# Patient Record
Sex: FEMALE | Race: BLACK OR AFRICAN AMERICAN | Employment: OTHER
[De-identification: names, ages, dates, MRNs, and addresses within clinical notes are randomized per-mention and may not be internally consistent; named-entity substitution may affect disease eponyms.]

---

## 2023-02-19 ENCOUNTER — APPOINTMENT (OUTPATIENT)
Facility: HOSPITAL | Age: 73
DRG: 287 | End: 2023-02-19
Payer: MEDICARE

## 2023-02-19 ENCOUNTER — HOSPITAL ENCOUNTER (INPATIENT)
Facility: HOSPITAL | Age: 73
LOS: 3 days | Discharge: HOME | DRG: 287 | End: 2023-02-22
Attending: EMERGENCY MEDICINE | Admitting: HOSPITALIST
Payer: MEDICARE

## 2023-02-19 DIAGNOSIS — I77.819 AORTIC ECTASIA (HCC): ICD-10-CM

## 2023-02-19 DIAGNOSIS — I25.119 ATHEROSCLEROSIS OF NATIVE CORONARY ARTERY OF NATIVE HEART WITH ANGINA PECTORIS (HCC): ICD-10-CM

## 2023-02-19 DIAGNOSIS — R94.39 ABNORMAL NUCLEAR STRESS TEST: ICD-10-CM

## 2023-02-19 DIAGNOSIS — I20.0 ACCELERATING ANGINA (HCC): ICD-10-CM

## 2023-02-19 DIAGNOSIS — R07.9 ACUTE CHEST PAIN: ICD-10-CM

## 2023-02-19 DIAGNOSIS — I25.9 CHEST PAIN DUE TO MYOCARDIAL ISCHEMIA, UNSPECIFIED ISCHEMIC CHEST PAIN TYPE: ICD-10-CM

## 2023-02-19 DIAGNOSIS — R07.89 OTHER CHEST PAIN: Primary | ICD-10-CM

## 2023-02-19 PROBLEM — E11.9 TYPE 2 DIABETES MELLITUS (HCC): Status: ACTIVE | Noted: 2023-02-19

## 2023-02-19 PROBLEM — F32.A DEPRESSION: Status: ACTIVE | Noted: 2023-02-19

## 2023-02-19 PROBLEM — G89.29 CHRONIC PAIN: Status: ACTIVE | Noted: 2023-02-19

## 2023-02-19 PROBLEM — E78.5 HYPERLIPIDEMIA: Status: ACTIVE | Noted: 2023-02-19

## 2023-02-19 LAB
ALBUMIN SERPL-MCNC: 3.8 G/DL (ref 3.4–5)
ALBUMIN/GLOB SERPL: 0.9 (ref 0.8–1.7)
ALP SERPL-CCNC: 124 U/L (ref 45–117)
ALT SERPL-CCNC: 15 U/L (ref 13–56)
ANION GAP SERPL CALC-SCNC: 6 MMOL/L (ref 3–18)
AST SERPL-CCNC: 6 U/L (ref 10–38)
BASOPHILS # BLD: 0.1 K/UL (ref 0–0.1)
BASOPHILS NFR BLD: 1 % (ref 0–2)
BILIRUB SERPL-MCNC: 0.6 MG/DL (ref 0.2–1)
BUN SERPL-MCNC: 12 MG/DL (ref 7–18)
BUN/CREAT SERPL: 11 (ref 12–20)
CALCIUM SERPL-MCNC: 9.6 MG/DL (ref 8.5–10.1)
CHLORIDE SERPL-SCNC: 102 MMOL/L (ref 100–111)
CO2 SERPL-SCNC: 29 MMOL/L (ref 21–32)
CREAT SERPL-MCNC: 1.12 MG/DL (ref 0.6–1.3)
D DIMER PPP FEU-MCNC: 1.12 UG/ML(FEU)
DIFFERENTIAL METHOD BLD: ABNORMAL
EOSINOPHIL # BLD: 0.1 K/UL (ref 0–0.4)
EOSINOPHIL NFR BLD: 1 % (ref 0–5)
ERYTHROCYTE [DISTWIDTH] IN BLOOD BY AUTOMATED COUNT: 13.5 % (ref 11.6–14.5)
GLOBULIN SER CALC-MCNC: 4.3 G/DL (ref 2–4)
GLUCOSE BLD STRIP.AUTO-MCNC: 172 MG/DL (ref 70–110)
GLUCOSE BLD STRIP.AUTO-MCNC: 206 MG/DL (ref 70–110)
GLUCOSE SERPL-MCNC: 250 MG/DL (ref 74–99)
HCT VFR BLD AUTO: 41 % (ref 35–45)
HGB BLD-MCNC: 13.5 G/DL (ref 12–16)
IMM GRANULOCYTES # BLD AUTO: 0 K/UL (ref 0–0.04)
IMM GRANULOCYTES NFR BLD AUTO: 0 % (ref 0–0.5)
LIPASE SERPL-CCNC: 135 U/L (ref 73–393)
LYMPHOCYTES # BLD: 2.2 K/UL (ref 0.9–3.6)
LYMPHOCYTES NFR BLD: 19 % (ref 21–52)
MCH RBC QN AUTO: 27.4 PG (ref 24–34)
MCHC RBC AUTO-ENTMCNC: 32.9 G/DL (ref 31–37)
MCV RBC AUTO: 83.3 FL (ref 78–100)
MONOCYTES # BLD: 0.6 K/UL (ref 0.05–1.2)
MONOCYTES NFR BLD: 5 % (ref 3–10)
NEUTS SEG # BLD: 8.8 K/UL (ref 1.8–8)
NEUTS SEG NFR BLD: 75 % (ref 40–73)
NRBC # BLD: 0 K/UL (ref 0–0.01)
NRBC BLD-RTO: 0 PER 100 WBC
PLATELET # BLD AUTO: 418 K/UL (ref 135–420)
PMV BLD AUTO: 9.3 FL (ref 9.2–11.8)
POTASSIUM SERPL-SCNC: 3.5 MMOL/L (ref 3.5–5.5)
PROT SERPL-MCNC: 8.1 G/DL (ref 6.4–8.2)
RBC # BLD AUTO: 4.92 M/UL (ref 4.2–5.3)
SODIUM SERPL-SCNC: 137 MMOL/L (ref 136–145)
TROPONIN I SERPL HS-MCNC: 12 NG/L (ref 0–54)
TROPONIN I SERPL HS-MCNC: 12 NG/L (ref 0–54)
TROPONIN I SERPL HS-MCNC: 14 NG/L (ref 0–54)
WBC # BLD AUTO: 11.9 K/UL (ref 4.6–13.2)

## 2023-02-19 PROCEDURE — 82962 GLUCOSE BLOOD TEST: CPT

## 2023-02-19 PROCEDURE — 84443 ASSAY THYROID STIM HORMONE: CPT

## 2023-02-19 PROCEDURE — 2580000003 HC RX 258: Performed by: HOSPITALIST

## 2023-02-19 PROCEDURE — 71045 X-RAY EXAM CHEST 1 VIEW: CPT

## 2023-02-19 PROCEDURE — 36415 COLL VENOUS BLD VENIPUNCTURE: CPT

## 2023-02-19 PROCEDURE — 80053 COMPREHEN METABOLIC PANEL: CPT

## 2023-02-19 PROCEDURE — 85025 COMPLETE CBC W/AUTO DIFF WBC: CPT

## 2023-02-19 PROCEDURE — 1100000003 HC PRIVATE W/ TELEMETRY

## 2023-02-19 PROCEDURE — 83690 ASSAY OF LIPASE: CPT

## 2023-02-19 PROCEDURE — 84484 ASSAY OF TROPONIN QUANT: CPT

## 2023-02-19 PROCEDURE — 99285 EMERGENCY DEPT VISIT HI MDM: CPT

## 2023-02-19 PROCEDURE — 83036 HEMOGLOBIN GLYCOSYLATED A1C: CPT

## 2023-02-19 PROCEDURE — 6370000000 HC RX 637 (ALT 250 FOR IP): Performed by: HOSPITALIST

## 2023-02-19 PROCEDURE — 93005 ELECTROCARDIOGRAM TRACING: CPT | Performed by: EMERGENCY MEDICINE

## 2023-02-19 PROCEDURE — 6360000002 HC RX W HCPCS: Performed by: HOSPITALIST

## 2023-02-19 PROCEDURE — 6360000004 HC RX CONTRAST MEDICATION: Performed by: EMERGENCY MEDICINE

## 2023-02-19 PROCEDURE — 71275 CT ANGIOGRAPHY CHEST: CPT

## 2023-02-19 PROCEDURE — 85379 FIBRIN DEGRADATION QUANT: CPT

## 2023-02-19 PROCEDURE — 80061 LIPID PANEL: CPT

## 2023-02-19 RX ORDER — ASPIRIN 81 MG/1
81 TABLET, CHEWABLE ORAL DAILY
COMMUNITY

## 2023-02-19 RX ORDER — SODIUM CHLORIDE 9 MG/ML
INJECTION, SOLUTION INTRAVENOUS PRN
Status: DISCONTINUED | OUTPATIENT
Start: 2023-02-19 | End: 2023-02-22 | Stop reason: HOSPADM

## 2023-02-19 RX ORDER — VENLAFAXINE HYDROCHLORIDE 75 MG/1
CAPSULE, EXTENDED RELEASE ORAL
COMMUNITY
Start: 2023-01-25

## 2023-02-19 RX ORDER — SODIUM CHLORIDE 9 MG/ML
INJECTION, SOLUTION INTRAVENOUS CONTINUOUS
Status: DISPENSED | OUTPATIENT
Start: 2023-02-19 | End: 2023-02-20

## 2023-02-19 RX ORDER — ONDANSETRON 2 MG/ML
4 INJECTION INTRAMUSCULAR; INTRAVENOUS EVERY 6 HOURS PRN
Status: DISCONTINUED | OUTPATIENT
Start: 2023-02-19 | End: 2023-02-22 | Stop reason: HOSPADM

## 2023-02-19 RX ORDER — ACETAMINOPHEN 325 MG/1
650 TABLET ORAL EVERY 6 HOURS PRN
Status: DISCONTINUED | OUTPATIENT
Start: 2023-02-19 | End: 2023-02-22 | Stop reason: HOSPADM

## 2023-02-19 RX ORDER — ALPRAZOLAM 0.5 MG/1
0.5 TABLET ORAL DAILY
COMMUNITY
Start: 2023-02-15

## 2023-02-19 RX ORDER — ZOLPIDEM TARTRATE 5 MG/1
5 TABLET ORAL NIGHTLY PRN
Status: DISCONTINUED | OUTPATIENT
Start: 2023-02-19 | End: 2023-02-22 | Stop reason: HOSPADM

## 2023-02-19 RX ORDER — VENLAFAXINE HYDROCHLORIDE 150 MG/1
150 CAPSULE, EXTENDED RELEASE ORAL
Status: DISCONTINUED | OUTPATIENT
Start: 2023-02-20 | End: 2023-02-22 | Stop reason: HOSPADM

## 2023-02-19 RX ORDER — NALOXONE HYDROCHLORIDE 4 MG/.1ML
4 SPRAY NASAL PRN
COMMUNITY
Start: 2022-10-01

## 2023-02-19 RX ORDER — ALPRAZOLAM 0.5 MG/1
0.5 TABLET ORAL EVERY 8 HOURS PRN
Status: DISCONTINUED | OUTPATIENT
Start: 2023-02-19 | End: 2023-02-22 | Stop reason: HOSPADM

## 2023-02-19 RX ORDER — POTASSIUM CHLORIDE 20 MEQ/1
20 TABLET, EXTENDED RELEASE ORAL
Status: DISCONTINUED | OUTPATIENT
Start: 2023-02-20 | End: 2023-02-22 | Stop reason: HOSPADM

## 2023-02-19 RX ORDER — DEXTROSE MONOHYDRATE 100 MG/ML
INJECTION, SOLUTION INTRAVENOUS CONTINUOUS PRN
Status: DISCONTINUED | OUTPATIENT
Start: 2023-02-19 | End: 2023-02-22 | Stop reason: HOSPADM

## 2023-02-19 RX ORDER — INSULIN LISPRO 100 [IU]/ML
0-16 INJECTION, SOLUTION INTRAVENOUS; SUBCUTANEOUS
Status: DISCONTINUED | OUTPATIENT
Start: 2023-02-19 | End: 2023-02-22 | Stop reason: HOSPADM

## 2023-02-19 RX ORDER — VENLAFAXINE HYDROCHLORIDE 150 MG/1
150 CAPSULE, EXTENDED RELEASE ORAL DAILY
COMMUNITY
Start: 2022-11-22

## 2023-02-19 RX ORDER — MORPHINE SULFATE 2 MG/ML
1 INJECTION, SOLUTION INTRAMUSCULAR; INTRAVENOUS EVERY 4 HOURS PRN
Status: DISCONTINUED | OUTPATIENT
Start: 2023-02-19 | End: 2023-02-22 | Stop reason: HOSPADM

## 2023-02-19 RX ORDER — HYDROCHLOROTHIAZIDE 25 MG/1
25 TABLET ORAL DAILY
COMMUNITY
Start: 2022-11-22

## 2023-02-19 RX ORDER — HYDROCODONE BITARTRATE AND ACETAMINOPHEN 10; 325 MG/1; MG/1
1 TABLET ORAL EVERY 6 HOURS PRN
Status: DISCONTINUED | OUTPATIENT
Start: 2023-02-19 | End: 2023-02-22 | Stop reason: HOSPADM

## 2023-02-19 RX ORDER — HYDROCHLOROTHIAZIDE 25 MG/1
25 TABLET ORAL DAILY
Status: DISCONTINUED | OUTPATIENT
Start: 2023-02-19 | End: 2023-02-22 | Stop reason: HOSPADM

## 2023-02-19 RX ORDER — NALOXONE HYDROCHLORIDE 0.4 MG/ML
0.4 INJECTION, SOLUTION INTRAMUSCULAR; INTRAVENOUS; SUBCUTANEOUS PRN
Status: DISCONTINUED | OUTPATIENT
Start: 2023-02-19 | End: 2023-02-22 | Stop reason: HOSPADM

## 2023-02-19 RX ORDER — LISINOPRIL 20 MG/1
20 TABLET ORAL DAILY
Status: DISCONTINUED | OUTPATIENT
Start: 2023-02-19 | End: 2023-02-22 | Stop reason: HOSPADM

## 2023-02-19 RX ORDER — ONDANSETRON 4 MG/1
4 TABLET, ORALLY DISINTEGRATING ORAL EVERY 8 HOURS PRN
Status: DISCONTINUED | OUTPATIENT
Start: 2023-02-19 | End: 2023-02-22 | Stop reason: HOSPADM

## 2023-02-19 RX ORDER — POLYETHYLENE GLYCOL 3350 17 G/17G
17 POWDER, FOR SOLUTION ORAL DAILY PRN
Status: DISCONTINUED | OUTPATIENT
Start: 2023-02-19 | End: 2023-02-22 | Stop reason: HOSPADM

## 2023-02-19 RX ORDER — INSULIN LISPRO 100 [IU]/ML
0-4 INJECTION, SOLUTION INTRAVENOUS; SUBCUTANEOUS NIGHTLY
Status: DISCONTINUED | OUTPATIENT
Start: 2023-02-19 | End: 2023-02-22 | Stop reason: HOSPADM

## 2023-02-19 RX ORDER — AMLODIPINE BESYLATE AND BENAZEPRIL HYDROCHLORIDE 5; 20 MG/1; MG/1
1 CAPSULE ORAL DAILY
COMMUNITY
Start: 2022-11-22

## 2023-02-19 RX ORDER — HYDROCODONE BITARTRATE AND ACETAMINOPHEN 10; 325 MG/1; MG/1
1 TABLET ORAL EVERY 6 HOURS PRN
COMMUNITY
Start: 2023-02-08

## 2023-02-19 RX ORDER — SODIUM CHLORIDE 0.9 % (FLUSH) 0.9 %
5-40 SYRINGE (ML) INJECTION PRN
Status: DISCONTINUED | OUTPATIENT
Start: 2023-02-19 | End: 2023-02-22 | Stop reason: HOSPADM

## 2023-02-19 RX ORDER — ASPIRIN 81 MG/1
81 TABLET ORAL DAILY
Status: DISCONTINUED | OUTPATIENT
Start: 2023-02-19 | End: 2023-02-22 | Stop reason: HOSPADM

## 2023-02-19 RX ORDER — SODIUM CHLORIDE 0.9 % (FLUSH) 0.9 %
5-40 SYRINGE (ML) INJECTION EVERY 12 HOURS SCHEDULED
Status: DISCONTINUED | OUTPATIENT
Start: 2023-02-19 | End: 2023-02-22 | Stop reason: HOSPADM

## 2023-02-19 RX ORDER — ENOXAPARIN SODIUM 100 MG/ML
40 INJECTION SUBCUTANEOUS DAILY
Status: DISCONTINUED | OUTPATIENT
Start: 2023-02-19 | End: 2023-02-22 | Stop reason: HOSPADM

## 2023-02-19 RX ORDER — ZOLPIDEM TARTRATE 10 MG/1
10 TABLET ORAL DAILY PRN
COMMUNITY
Start: 2022-10-12

## 2023-02-19 RX ORDER — PANTOPRAZOLE SODIUM 40 MG/1
40 TABLET, DELAYED RELEASE ORAL
Status: DISCONTINUED | OUTPATIENT
Start: 2023-02-20 | End: 2023-02-22 | Stop reason: HOSPADM

## 2023-02-19 RX ORDER — POTASSIUM CHLORIDE 20 MEQ/1
20 TABLET, EXTENDED RELEASE ORAL DAILY
COMMUNITY
Start: 2023-02-06

## 2023-02-19 RX ORDER — VENLAFAXINE HYDROCHLORIDE 75 MG/1
75 CAPSULE, EXTENDED RELEASE ORAL
Status: DISCONTINUED | OUTPATIENT
Start: 2023-02-20 | End: 2023-02-22 | Stop reason: HOSPADM

## 2023-02-19 RX ORDER — NITROGLYCERIN 0.4 MG/1
0.4 TABLET SUBLINGUAL EVERY 5 MIN PRN
Status: DISCONTINUED | OUTPATIENT
Start: 2023-02-19 | End: 2023-02-22 | Stop reason: HOSPADM

## 2023-02-19 RX ORDER — AMLODIPINE BESYLATE 5 MG/1
5 TABLET ORAL DAILY
Status: DISCONTINUED | OUTPATIENT
Start: 2023-02-19 | End: 2023-02-22 | Stop reason: HOSPADM

## 2023-02-19 RX ORDER — ATORVASTATIN CALCIUM 20 MG/1
20 TABLET, FILM COATED ORAL NIGHTLY
Status: DISCONTINUED | OUTPATIENT
Start: 2023-02-19 | End: 2023-02-22 | Stop reason: HOSPADM

## 2023-02-19 RX ORDER — SEMAGLUTIDE 1.34 MG/ML
0.25 INJECTION, SOLUTION SUBCUTANEOUS
COMMUNITY
Start: 2023-02-06

## 2023-02-19 RX ORDER — ACETAMINOPHEN 650 MG/1
650 SUPPOSITORY RECTAL EVERY 6 HOURS PRN
Status: DISCONTINUED | OUTPATIENT
Start: 2023-02-19 | End: 2023-02-22 | Stop reason: HOSPADM

## 2023-02-19 RX ORDER — IBUPROFEN 800 MG/1
800 TABLET ORAL EVERY 6 HOURS PRN
COMMUNITY
Start: 2023-01-21

## 2023-02-19 RX ADMIN — ALPRAZOLAM 0.5 MG: 0.5 TABLET ORAL at 22:18

## 2023-02-19 RX ADMIN — ATORVASTATIN CALCIUM 20 MG: 20 TABLET, FILM COATED ORAL at 20:31

## 2023-02-19 RX ADMIN — IOPAMIDOL 100 ML: 755 INJECTION, SOLUTION INTRAVENOUS at 14:31

## 2023-02-19 RX ADMIN — SODIUM CHLORIDE, PRESERVATIVE FREE 10 ML: 5 INJECTION INTRAVENOUS at 22:08

## 2023-02-19 RX ADMIN — LISINOPRIL 20 MG: 20 TABLET ORAL at 20:31

## 2023-02-19 RX ADMIN — ENOXAPARIN SODIUM 40 MG: 100 INJECTION SUBCUTANEOUS at 18:08

## 2023-02-19 RX ADMIN — HYDROCHLOROTHIAZIDE 25 MG: 25 TABLET ORAL at 19:02

## 2023-02-19 RX ADMIN — AMLODIPINE BESYLATE 5 MG: 5 TABLET ORAL at 19:02

## 2023-02-19 RX ADMIN — SODIUM CHLORIDE: 9 INJECTION, SOLUTION INTRAVENOUS at 18:50

## 2023-02-19 RX ADMIN — ZOLPIDEM TARTRATE 5 MG: 5 TABLET ORAL at 23:54

## 2023-02-19 ASSESSMENT — ENCOUNTER SYMPTOMS
COUGH: 0
ABDOMINAL PAIN: 0
SHORTNESS OF BREATH: 1
VOMITING: 1
NAUSEA: 1
DIARRHEA: 0
CHEST TIGHTNESS: 1

## 2023-02-19 ASSESSMENT — LIFESTYLE VARIABLES
HOW OFTEN DO YOU HAVE A DRINK CONTAINING ALCOHOL: NEVER
HOW MANY STANDARD DRINKS CONTAINING ALCOHOL DO YOU HAVE ON A TYPICAL DAY: PATIENT DOES NOT DRINK

## 2023-02-19 ASSESSMENT — HEART SCORE: ECG: 1

## 2023-02-19 NOTE — Clinical Note
TRANSFER - IN REPORT:     Verbal report received from: [address] Report consisted of patient's Situation, Background, Assessment and   Recommendations(SBAR). Opportunity for questions and clarification was provided. Assessment completed upon patient's arrival to unit and care assumed. Patient transported with a Registered Nurse.

## 2023-02-19 NOTE — ED NOTES
Pt taken upstairs by RN on cardiac monitoring. Pt ambulatory to inpt bed with steady gait, VSS, NAD.   Call bell and phone within reach  [facility], [address] [place] [address] aware of patients arrival      [address]  02/19/23 1837

## 2023-02-19 NOTE — H&P
History & Physical    Patient: Walker Madrid MRN: [id number]  CSN: [id number]    YOB: 1950  Age: 74 y.o. Sex: female      DOA: 2/19/2023  Primary Care Provider:  None Provider      Assessment/Plan     Active Hospital Problems    Diagnosis Date Noted    Chest pain [R07.9] 02/19/2023     Priority: Medium    Type 2 diabetes mellitus ([address] [E11.9] 02/19/2023     Priority: Medium    Hyperlipidemia [E78.5] 02/19/2023     Priority: Medium    Acute chest pain [R07.9] 02/19/2023     Priority: Medium    Aortic ectasia ([address] [I77.819] 02/19/2023     Priority: Medium    Depression [F32. A] 02/19/2023     Priority: Medium    Chronic pain [G89.29] 02/19/2023     Priority: Medium     Admit to tele     Chest pain  Cardiac monitor, ce trend need to r/o acs   Ekg: no st change at this point,  morphine /nitro prn for chest pain  Cardiology consult   Echo and stress test  tomorrow       DM type II , used to take insulin , now on semaglutide (OZEMPIC   ssi, diabetic diet , hypoglycemia protocol       HTN, accelerated  Continue home medication. Depression and anxiety   On effexor highest dose at home   Xanax at home as needed   Also on ambien at night     Hld check lipid profile continue lipitor     Aortic ectasia  Need to follow-up with cardiologist    Chronic pain continue home pain medication  Narcan as needed    We will give low rate hydration overnight. Full code   Please note that this dictation was completed with [employer], the [employer] voice recognition software. Quite often unanticipated grammatical, syntax, homophones, and other interpretive errors are inadvertently transcribed by the computer software. Please disregard these errors.   Please excuse any errors that have escaped final proofreading    Estimate  length of stay : 1-2 days     DVT :lovenox and ppi   CC: chest tightness       HPI:     Walker Madrid is a 74 y.o. female with history of diabetes, hypertension, depression, hyperlipidemia, insomnia presented to ER due to chest tightness last night. Associated with nausea and diaphoresis. The tightness is in the middle of the chest.  Not associate with shortness of breath no palpitation. She was giving aspirin and a nitro per EMS, chest tightness resolved. In ER, EKG no ST segment changes and  first troponin was negative. She has elevated D-dimer,  CTA chest no PE. Cardiology has been consulted and recommend to admit to  hospital for further work-up. Patient refused to stay, after discussed with pt and family, she agrees to stay in the hospital  for ischemic cardiac work-up. Granddaughter was at the bedside. Hoe medication reviewed   BP (!) 151/77   Pulse 63   Temp 97.8 °F (36.6 °C) (Oral)   Resp 14   SpO2 99%         Surgical History    Surgical History  Surgery Date Site/Laterality Comments   COLONOSCOPY 2014 N/A/N/A Procedure: COLONOSCOPY; Surgeon: Zehra Deng MD; Location: [facility] ENDO OR LOC; Service: Endoscopy GI; Laterality: N/A; 25471     COLONOSCOPY 2014 Anus/N/A Procedure: COLONOSCOPY; Surgeon: Zehra Deng MD; Location: [facility] ENDO OR LOC; Service: Endoscopy GI; Laterality: N/A; 24928     IN ENDO COLONOSCOPY           SECTION          CHOLECYSTECTOMY        Family History    Family History  Medical History Relation Name Comments   Other Family History Father        Dementia Mother          Family History  Relation Name Status Comments   Father         Mother             Past medical history   Chronic pain   Htn   Hld     Social History     Socioeconomic History    Marital status:        Prior to Admission medications    Not on File       No Known Allergies    Review of Systems  Gen: No fever, chills, malaise, weight loss/gain. +Diaphoresis  Heent: No headache, rhinorrhea, epistaxis, ear pain, hearing loss, sinus pain, neck pain/stiffness, sore throat. Heart: + Chest tightness, no palpitations, CARSON, pnd, or orthopnea.    Resp: No cough, hemoptysis, wheezing and shortness of breath. GI: +nausea, no vomiting, diarrhea, constipation, melena or hematochezia. : No urinary obstruction, dysuria or hematuria. Derm: No rash, new skin lesion or pruritis. Musc/skeletal: no bone or joint complains. Vasc: No edema, cyanosis or claudication. Endo: No heat/cold intolerance, no polyuria,polydipsia or polyphagia. Neuro: No unilateral weakness, numbness, tingling. No seizures. Heme: No easy bruising or bleeding. Physical Exam:     Physical Exam:  BP (!) 151/77   Pulse 63   Temp 97.8 °F (36.6 °C) (Oral)   Resp 14   SpO2 99%         Temp (24hrs), Av.8 °F (36.6 °C), Min:97.8 °F (36.6 °C), Max:97.8 °F (36.6 °C)    No intake/output data recorded. No intake/output data recorded. General:  Awake, cooperative, no distress. Head:  Normocephalic, without obvious abnormality, atraumatic. Eyes:  Conjunctivae/corneas clear, sclera anicteric, PERRL, EOMs intact. Nose: Nares normal. No drainage or sinus tenderness. Throat: Lips, mucosa, and tongue normal. .   Neck: Supple, symmetrical, trachea midline, no adenopathy. Lungs:   Clear to auscultation bilaterally. Heart:  Regular rate and rhythm, S1, S2 normal, no murmur, click, rub or gallop. Abdomen: Soft, non-tender. Bowel sounds normal. No masses,  No organomegaly. Extremities: Extremities normal, atraumatic, no cyanosis or edema. Pulses: 2+ and symmetric all extremities. Skin: Skin color-pink, texture, turgor normal. No rashes or lesions. Capillary refill normal    Neurologic: CNII-XII intact. No focal motor or sensory deficit.        Labs Reviewed:       BMP:   Lab Results   Component Value Date/Time     2023 10:53 AM    K 3.5 2023 10:53 AM     2023 10:53 AM    CO2 29 2023 10:53 AM    BUN 12 2023 10:53 AM    CREATININE 1.12 2023 10:53 AM    GLUCOSE 250 2023 10:53 AM    CALCIUM 9.6 2023 10:53 AM Last 3 BMP:   Recent Labs     02/19/23  1053      K 3.5      CO2 29   BUN 12   CREATININE 1.12   GLUCOSE 250*   CALCIUM 9.6    CMP:   Lab Results   Component Value Date/Time     02/19/2023 10:53 AM    K 3.5 02/19/2023 10:53 AM     02/19/2023 10:53 AM    CO2 29 02/19/2023 10:53 AM    BUN 12 02/19/2023 10:53 AM    CREATININE 1.12 02/19/2023 10:53 AM    GLUCOSE 250 02/19/2023 10:53 AM    CALCIUM 9.6 02/19/2023 10:53 AM    PROT 8.1 02/19/2023 10:53 AM    LABALBU 3.8 02/19/2023 10:53 AM    BILITOT 0.6 02/19/2023 10:53 AM    AST 6 02/19/2023 10:53 AM    ALT 15 02/19/2023 10:53 AM      Last 3 CMP:   Recent Labs     02/19/23  1053      K 3.5      CO2 29   BUN 12   CREATININE 1.12   GLUCOSE 250*   CALCIUM 9.6   PROT 8.1   LABALBU 3.8   ALKPHOS 124*   AST 6*   ALT 15      Glucose:   Lab Results   Component Value Date/Time    GLUCOSE 250 02/19/2023 10:53 AM      Last 3 Glucose:   Recent Labs     02/19/23  1053   GLUCOSE 250*      POC Glucose: No results found for: POCGLU   Last 3 POC Glucose: No results for input(s): POCGLU in the last 72 hours. Last 3 CK, CKMB, Troponin: No results for input(s): CKTOTAL, CKMB, TROPONINI in the last 72 hours.    CBC:   Lab Results   Component Value Date/Time    WBC 11.9 02/19/2023 10:53 AM    RBC 4.92 02/19/2023 10:53 AM    HGB 13.5 02/19/2023 10:53 AM    HCT 41.0 02/19/2023 10:53 AM    MCV 83.3 02/19/2023 10:53 AM    MCH 27.4 02/19/2023 10:53 AM    MCHC 32.9 02/19/2023 10:53 AM    RDW 13.5 02/19/2023 10:53 AM     02/19/2023 10:53 AM    MPV 9.3 02/19/2023 10:53 AM      Last 3 CBC:   Recent Labs     02/19/23  1053   WBC 11.9   RBC 4.92   HGB 13.5   HCT 41.0   MCV 83.3   MCH 27.4   MCHC 32.9   RDW 13.5      MPV 9.3      WBC:   Lab Results   Component Value Date/Time    WBC 11.9 02/19/2023 10:53 AM      Last 3 WBC:   Recent Labs     02/19/23  1053   WBC 11.9      Platelets:   Lab Results   Component Value Date/Time     02/19/2023 10:53 AM      Last 3 Platelets:   Recent Labs     02/19/23  1053         Hemoglobin/Hematocrit:   Lab Results   Component Value Date/Time    HGB 13.5 02/19/2023 10:53 AM    HCT 41.0 02/19/2023 10:53 AM      Last 3 Hemoglobin/Hematocrit:   Recent Labs     02/19/23  1053   HGB 13.5   HCT 41.0      Hepatic Function Panel:   Lab Results   Component Value Date/Time    ALKPHOS 124 02/19/2023 10:53 AM    ALT 15 02/19/2023 10:53 AM    AST 6 02/19/2023 10:53 AM    PROT 8.1 02/19/2023 10:53 AM    BILITOT 0.6 02/19/2023 10:53 AM    LABALBU 3.8 02/19/2023 10:53 AM      Last 3 Hepatic Function Panel:   Recent Labs     02/19/23  1053   ALKPHOS 124*   ALT 15   AST 6*   PROT 8.1   BILITOT 0.6   LABALBU 3.8      Albumin:   Lab Results   Component Value Date/Time    LABALBU 3.8 02/19/2023 10:53 AM      Calcium:   Lab Results   Component Value Date/Time    CALCIUM 9.6 02/19/2023 10:53 AM      Ionized Calcium: No results found for: IONCA   Magnesium: No results found for: MG   ABGs: No results found for: PHART, PO2ART, JMW1AHF, YOJ0VVR, BEART, Q2BYWXTC   Lactic Acid: No results found for: LACTA   Amylase: No results found for: AMYLASE   Last 3 Amylase: No results for input(s): AMYLASE in the last 72 hours. Lipase:   Lab Results   Component Value Date/Time    LIPASE 135 02/19/2023 10:53 AM      Last 3 Lipase:   Recent Labs     02/19/23  1053   LIPASE 135      BNP: No results found for: BNP   Last 3 BNP: No results for input(s): BNP in the last 72 hours. Lipids No results found for: CHOL, TRIG, LDLCALC, LABVLDL, CHOLHDLRATIO  Cardiac Enzymes: No results found for: CKTOTAL, CKMB, CKMBINDEX, TROPONINI  Urin analysis: No results for input(s): COLORU, CLARITYU, SPECGRAV, PHUR, PROTEINU, GLUCOSEU, KETUA, BILIRUBINUR, Sydell Yu in the last 72 hours.     Invalid input(s): FLOODU    Imaging results last 24 hrs :CTA CHEST W WO CONTRAST    Result Date: 2/19/2023  EXAM: CTA CHEST W WO CONTRAST INDICATION: Chest Pain and positive D Dimer, PE protocol COMPARISON: None. CONTRAST: 100 mL of Isovue-370. TECHNIQUE: Precontrast  images were obtained to localize the volume for acquisition. Multislice helical CT arteriography was performed from the diaphragm to the thoracic inlet during uneventful rapid bolus intravenous contrast administration. Lung and soft tissue windows were generated. Coronal and sagittal images were generated and 3D post processing consisting of coronal maximum intensity images was performed. CT dose reduction was achieved through use of a standardized protocol tailored for this examination and automatic exposure control for dose modulation. FINDINGS: The lungs are clear of mass, nodule, airspace disease or edema. The pulmonary arteries are well enhanced and no pulmonary emboli are identified. Main pulmonary artery measures 3.4 cm in axial diameter. Mild cardiomegaly. There is no mediastinal or hilar adenopathy or mass. Right paratracheal node is borderline at 9 mm. The aorta enhances normally without evidence of aneurysm or dissection. The ascending aorta measures 3.9 cm in cross-sectional diameter. Mild coronary artery calcification. The visualized portions of the upper abdominal organs are remarkable for a left mid renal macrolobulated 2.5 cm cyst. Cholecystectomy clips. No evidence for pulmonary embolism. Clear lungs Markedly ectatic ascending aorta, measuring 3.9 cm in diameter. Dilated main pulmonary artery, measuring 3.4 cm in axial diameter, suggesting possible pulmonary arterial hypertension. Mild cardiomegaly. Mild coronary atherosclerosis. XR CHEST 1 VIEW    Result Date: 2/19/2023  EXAM:  XR CHEST 1 VIEW INDICATION: Chest pain COMPARISON: None. TECHNIQUE: Portable AP upright chest view at 1121 hours FINDINGS: The cardiomediastinal contours are within normal limits. The lungs and pleural spaces are clear. There is no pneumothorax. The bones and upper abdomen are unremarkable. No acute process. Imaging results impression onlyCTA CHEST W WO CONTRAST    Result Date: 2/19/2023  No evidence for pulmonary embolism. Clear lungs Markedly ectatic ascending aorta, measuring 3.9 cm in diameter. Dilated main pulmonary artery, measuring 3.4 cm in axial diameter, suggesting possible pulmonary arterial hypertension. Mild cardiomegaly. Mild coronary atherosclerosis. XR CHEST 1 VIEW    Result Date: 2/19/2023  No acute process. CTA CHEST W WO CONTRAST   Final Result      No evidence for pulmonary embolism. Clear lungs   Markedly ectatic ascending aorta, measuring 3.9 cm in diameter. Dilated main pulmonary artery, measuring 3.4 cm in axial diameter, suggesting   possible pulmonary arterial hypertension. Mild cardiomegaly. Mild coronary atherosclerosis. XR CHEST 1 VIEW   Final Result   No acute process. LAST EKG  No results found for this or any previous visit. Ventilator setting: No results found for: FIO2, MODE, SETTIDVOL, SETPEEP    VENT SETTINGS (Comprehensive)     Additional Respiratory Assessments  Heart Rate: 63  Resp: 14  SpO2: 99 %     ABGs: No results found for: PHART, PO2ART, HNM1NHD     No results found for: Jose Fonder, SETPEEP         CTA CHEST W WO CONTRAST    Result Date: 2/19/2023  EXAM: CTA CHEST W WO CONTRAST INDICATION: Chest Pain and positive D Dimer, PE protocol COMPARISON: None. CONTRAST: 100 mL of Isovue-370. TECHNIQUE: Precontrast  images were obtained to localize the volume for acquisition. Multislice helical CT arteriography was performed from the diaphragm to the thoracic inlet during uneventful rapid bolus intravenous contrast administration. Lung and soft tissue windows were generated. Coronal and sagittal images were generated and 3D post processing consisting of coronal maximum intensity images was performed.   CT dose reduction was achieved through use of a standardized protocol tailored for this examination and automatic exposure control for dose modulation. FINDINGS: The lungs are clear of mass, nodule, airspace disease or edema. The pulmonary arteries are well enhanced and no pulmonary emboli are identified. Main pulmonary artery measures 3.4 cm in axial diameter. Mild cardiomegaly. There is no mediastinal or hilar adenopathy or mass. Right paratracheal node is borderline at 9 mm. The aorta enhances normally without evidence of aneurysm or dissection. The ascending aorta measures 3.9 cm in cross-sectional diameter. Mild coronary artery calcification. The visualized portions of the upper abdominal organs are remarkable for a left mid renal macrolobulated 2.5 cm cyst. Cholecystectomy clips. No evidence for pulmonary embolism. Clear lungs Markedly ectatic ascending aorta, measuring 3.9 cm in diameter. Dilated main pulmonary artery, measuring 3.4 cm in axial diameter, suggesting possible pulmonary arterial hypertension. Mild cardiomegaly. Mild coronary atherosclerosis. XR CHEST 1 VIEW    Result Date: 2/19/2023  EXAM:  XR CHEST 1 VIEW INDICATION: Chest pain COMPARISON: None. TECHNIQUE: Portable AP upright chest view at 1121 hours FINDINGS: The cardiomediastinal contours are within normal limits. The lungs and pleural spaces are clear. There is no pneumothorax. The bones and upper abdomen are unremarkable. No acute process.        Leslye Bethea MD, Internal Medicine     CC: None Provider

## 2023-02-19 NOTE — H&P (VIEW-ONLY)
[facility] Consult Note      Patient: Garry Gomez MRN: [id number]  SSN: [id number]    YOB: 1950  Age: 74 y.o. Sex: female    Date of Consultation: 2/19/2023    Reason for Consultation: Chest pain with multiple risk factors. HPI:  I was asked by Dr. Sally Haro to see this pleasant patient for chest pain. Garry Gomez is a 69-year-old pleasant patient with history of diabetes, hypertension, hyperlipidemia, obesity came to the hospital with chest tightness associated with sweating and 1 episode of nausea and vomiting. Patient ischemic testing initially is negative but multiple risk factor. Patient pulmonary embolism is ruled out with CT scan. At this point patient is chest pain-free. No known history of CAD, PAD, DVT pulmonary embolism or TIA or stroke. No recent ischemic cardiac testing done, due to high risk history with risk factor patient was advised to get admitted for echocardiogram and stress test.             No past medical history on file. No past surgical history on file.   Current Facility-Administered Medications   Medication Dose Route Frequency    nitroGLYCERIN (NITROSTAT) SL tablet 0.4 mg  0.4 mg SubLINGual Q5 Min PRN    sodium chloride flush 0.9 % injection 5-40 mL  5-40 mL IntraVENous 2 times per day    sodium chloride flush 0.9 % injection 5-40 mL  5-40 mL IntraVENous PRN    0.9 % sodium chloride infusion   IntraVENous PRN    enoxaparin (LOVENOX) injection 40 mg  40 mg SubCUTAneous Daily    ondansetron (ZOFRAN-ODT) disintegrating tablet 4 mg  4 mg Oral Q8H PRN    Or    ondansetron (ZOFRAN) injection 4 mg  4 mg IntraVENous Q6H PRN    polyethylene glycol (GLYCOLAX) packet 17 g  17 g Oral Daily PRN    acetaminophen (TYLENOL) tablet 650 mg  650 mg Oral Q6H PRN    Or    acetaminophen (TYLENOL) suppository 650 mg  650 mg Rectal Q6H PRN    aspirin EC tablet 81 mg  81 mg Oral Daily    [START ON 2/20/2023] pantoprazole (PROTONIX) tablet 40 mg  40 mg Oral QAM AC    morphine (PF) injection 1 mg  1 mg IntraVENous Q4H PRN     No current outpatient medications on file. Allergies and Intolerances:   No Known Allergies    Family History:   No family history on file. Social History:   She  has no history on file for tobacco use. She  has no history on file for alcohol use. Review of Systems:     Review of Systems  Gen: No fever, chills, malaise, weight loss/gain. Heent: No headache, rhinorrhea, epistaxis, ear pain, hearing loss, sinus pain, neck pain/stiffness, sore throat. Heart: + chest pain, palpitations, CARSON, pnd, or orthopnea. Resp: No cough, hemoptysis, wheezing and shortness of breath. GI: + nausea, +vomiting, diarrhea, constipation, melena or hematochezia. : No urinary obstruction, dysuria or hematuria. Derm: No rash, new skin lesion or pruritis. Musc/skeletal: no bone or joint complains. Vasc: No edema, cyanosis or claudication. Endo: No heat/cold intolerance, no polyuria,polydipsia or polyphagia. Neuro: No unilateral weakness, numbness, tingling. No seizures. Heme: No easy bruising or bleeding. Physical:   Patient Vitals for the past 6 hrs:   Pulse Resp BP SpO2   02/19/23 1709 63 14 (!) 151/77 99 %   02/19/23 1702 62 13 -- 99 %   02/19/23 1701 62 14 -- 99 %   02/19/23 1531 62 15 -- 100 %   02/19/23 1516 61 16 (!) 159/72 99 %   02/19/23 1249 63 12 138/76 99 %         Exam:   General Appearance: Comfortable, not using accessory muscles of respiration. HEENT: KIANNA. HEAD: Atraumatic  NECK: No JVD, no thyroidomeglay. CAROTIDS:clear  LUNGS: Clear bilaterally. HEART: S1+S2     ABD: Non-tender, BS Audible    EXT: No edema, and no cysnosis. VASCULAR EXAM: Pulses are intact. PSYCHIATRIC EXAM: Mood is appropriate. MUSCULOSKELETAL: Grossly no joint deformity. NEUROLOGICAL: Motor and sensory sytem intact and Cranial nerves II-XII intact.     Review of Data:   LABS:   Lab Results   Component Value Date/Time    WBC 11.9 02/19/2023 10:53 AM    HGB 13.5 02/19/2023 10:53 AM    HCT 41.0 02/19/2023 10:53 AM     02/19/2023 10:53 AM     Lab Results   Component Value Date/Time     02/19/2023 10:53 AM    K 3.5 02/19/2023 10:53 AM     02/19/2023 10:53 AM    CO2 29 02/19/2023 10:53 AM    BUN 12 02/19/2023 10:53 AM             Impression / Plan:    Patient Active Problem List   Diagnosis    Chest pain    Type 2 diabetes mellitus (HCC)    Hyperlipidemia    Acute chest pain    Aortic ectasia (HCC)       A/P  Chest pain possible accelerated angina  AMI ruled out  Multiple risk factors for CAD including hypertension, hyperlipidemia, diabetes mellitus and obesity      Plan  I will get echocardiogram and stress test tomorrow  Discussed in detail with patient and granddaughter  Discussed with Dr. Pranay Goodson  Thank you for allowing me to participate in the management of this pleasant patient. Please feel free to contact me if you have any questions or concerns.         Signed By: Cinthia Soriano MD     February 19, 2023

## 2023-02-19 NOTE — ED TRIAGE NOTES
Pt presents via EMS from home for sudden onset of chest tightness while sitting. Denies cardiac history, given 324ASA/1 nitro PTA.  Pt states some relief with interventions

## 2023-02-19 NOTE — ED NOTES
TRANSFER - OUT REPORT:    Verbal report given to teetee ramirez on Walker Madrid  being transferred to  for routine progression of patient care       Report consisted of patient's Situation, Background, Assessment and   Recommendations(SBAR). Information from the following report(s) Nurse Handoff Report was reviewed with the receiving nurse. [place] Assessment: No data recorded  Lines:   Peripheral IV 02/19/23 Left Antecubital (Active)   Site Assessment Clean, dry & intact 02/19/23 1057        Opportunity for questions and clarification was provided.       Patient transported with:  Monitor and Tech           [facility] Cast[facility]edaUP[facility]  02/19/23 8705

## 2023-02-19 NOTE — ED NOTES
[facility] [address] CARDIAC/MEDICAL  EMERGENCY DEPARTMENT HISTORY AND PHYSICAL EXAM      Date: 2/19/2023  Patient Name: Cruz Palmer      History of Presenting Illness     Chief Complaint   Patient presents with    Chest Pain         Location/Duration/Severity/Modifying factors   Patient is a 75-year-old female who is presenting to the emergency room with complaint of \"chest tightness\". The patient reports that the symptoms started yesterday. States it gets worse when she gets up and does things and moves. Was associated with breaking out in a sweat. Some nausea and one episode of vomiting. Denies any heart history, had a stress test many years ago but nothing recently. She states the symptoms are mild to moderate at this time. She is unable to provide a number. Described as a \"tightness\". DVT or PE in the past.  No recent travel or long distance travel or immobilization. There are no other complaints, changes, or physical findings at this time.     PCP: None Provider    Current Facility-Administered Medications   Medication Dose Route Frequency Provider Last Rate Last Admin    nitroGLYCERIN (NITROSTAT) SL tablet 0.4 mg  0.4 mg SubLINGual Q5 Min PRN Jarrod Delaney,         sodium chloride flush 0.9 % injection 5-40 mL  5-40 mL IntraVENous 2 times per day Crys Gutiérrez MD   10 mL at 02/19/23 2208    sodium chloride flush 0.9 % injection 5-40 mL  5-40 mL IntraVENous PRN Crys Gutiérrez MD        0.9 % sodium chloride infusion   IntraVENous PRN Crys Gutiérrez MD        enoxaparin (LOVENOX) injection 40 mg  40 mg SubCUTAneous Daily Crys Gutiérrez MD   40 mg at 02/19/23 1808    ondansetron (ZOFRAN-ODT) disintegrating tablet 4 mg  4 mg Oral Q8H PRN rCys Gutiérrez MD   4 mg at 02/20/23 0356    Or    ondansetron (ZOFRAN) injection 4 mg  4 mg IntraVENous Q6H PRN Crys Gutiérrez MD        polyethylene glycol (GLYCOLAX) packet 17 g  17 g Oral Daily PRN Crys Gutiérrez MD        acetaminophen (TYLENOL) tablet 650 mg  650 mg Oral Q6H PRN Crys Gutiérrez MD        Or acetaminophen (TYLENOL) suppository 650 mg  650 mg Rectal Q6H PRN Yayo Jones MD        aspirin EC tablet 81 mg  81 mg Oral Daily Yayo Jones MD        pantoprazole (PROTONIX) tablet 40 mg  40 mg Oral QAM AC Yayo Jones MD   40 mg at 02/20/23 2123    morphine (PF) injection 1 mg  1 mg IntraVENous Q4H PRN Yayo Jones MD   1 mg at 02/20/23 0429    atorvastatin (LIPITOR) tablet 20 mg  20 mg Oral Nightly Yayo Jones MD   20 mg at 02/19/23 2031    HYDROcodone-acetaminophen (NORCO)  MG per tablet 1 tablet  1 tablet Oral Q6H PRN Yayo Jones MD        venlafaxine (EFFEXOR XR) extended release capsule 75 mg  75 mg Oral Daily with breakfast Yayo Jones MD        venlafaxine (EFFEXOR XR) extended release capsule 150 mg  150 mg Oral Daily with breakfast Yayo Jones MD        glucose chewable tablet 16 g  4 tablet Oral PRN Yayo Jones MD        dextrose bolus 10% 125 mL  125 mL IntraVENous PRN Yayo Jones MD        Or    dextrose bolus 10% 250 mL  250 mL IntraVENous PRN Yayo Jones MD        glucagon (rDNA) injection 1 mg  1 mg SubCUTAneous PRN Yayo Jones MD        dextrose 10 % infusion   IntraVENous Continuous PRN Yayo Jones MD        insulin lispro (HUMALOG) injection vial 0-16 Units  0-16 Units SubCUTAneous TID WC Yayo Jones MD        insulin lispro (HUMALOG) injection vial 0-4 Units  0-4 Units SubCUTAneous Nightly Yayo Jones MD        amLODIPine (NORVASC) tablet 5 mg  5 mg Oral Daily Yayo Jones MD   5 mg at 02/19/23 1902    lisinopril (PRINIVIL;ZESTRIL) tablet 20 mg  20 mg Oral Daily Yayo Jones MD   20 mg at 02/19/23 2031    hydroCHLOROthiazide (HYDRODIURIL) tablet 25 mg  25 mg Oral Daily Yayo Jones MD   25 mg at 02/19/23 1902    potassium chloride (KLOR-CON M) extended release tablet 20 mEq  20 mEq Oral Daily with breakfast Yayo Jones MD        ALPRAZolam Derrek Robinson) tablet 0.5 mg  0.5 mg Oral Q8H PRN Yayo Jones MD   0.5 mg at 02/19/23 2218    zolpidem (AMBIEN) tablet 5 mg  5 mg Oral Nightly PRN Yayo Jones MD   5 mg at 02/19/23 6776    naloxone [facility]) injection 0.4 mg  0.4 mg IntraVENous PRN Luevenia Sandifer, MD           Past History     Past Medical History:  Past Medical History:   Diagnosis Date    Hypertension        Past Surgical History:  No past surgical history on file. Family History:  No family history on file.     Social History:  Social History     Tobacco Use    Smoking status: Never    Smokeless tobacco: Never   Substance Use Topics    Alcohol use: Never    Drug use: Never       Allergies:  No Known Allergies    Medications:  Current Facility-Administered Medications   Medication Dose Route Frequency Provider Last Rate Last Admin    nitroGLYCERIN (NITROSTAT) SL tablet 0.4 mg  0.4 mg SubLINGual Q5 Min PRN Porsche Gaona DO        sodium chloride flush 0.9 % injection 5-40 mL  5-40 mL IntraVENous 2 times per day Luevenia Sandifer, MD   10 mL at 02/19/23 2208    sodium chloride flush 0.9 % injection 5-40 mL  5-40 mL IntraVENous PRN Luevenia Sandifer, MD        0.9 % sodium chloride infusion   IntraVENous PRN Luevenia Sandifer, MD        enoxaparin (LOVENOX) injection 40 mg  40 mg SubCUTAneous Daily Luevenia Sandifer, MD   40 mg at 02/19/23 1808    ondansetron (ZOFRAN-ODT) disintegrating tablet 4 mg  4 mg Oral Q8H PRN Luevenia Sandifer, MD   4 mg at 02/20/23 0356    Or    ondansetron (ZOFRAN) injection 4 mg  4 mg IntraVENous Q6H PRN Luevenia Sandifer, MD        polyethylene glycol (GLYCOLAX) packet 17 g  17 g Oral Daily PRN Luevenia Sandifer, MD        acetaminophen (TYLENOL) tablet 650 mg  650 mg Oral Q6H PRN Luevenia Sandifer, MD        Or    acetaminophen (TYLENOL) suppository 650 mg  650 mg Rectal Q6H PRN Luevenia Sandifer, MD        aspirin EC tablet 81 mg  81 mg Oral Daily Luevenia Sandifer, MD        pantoprazole (PROTONIX) tablet 40 mg  40 mg Oral QAM AC Luevenia Sandifer, MD   40 mg at 02/20/23 3624    morphine (PF) injection 1 mg  1 mg IntraVENous Q4H PRN Luevenia Sandifer, MD   1 mg at 02/20/23 6129    atorvastatin (LIPITOR) tablet 20 mg  20 mg Oral Nightly Luevenia Sandifer, MD   20 mg at 02/19/23 2031    HYDROcodone-acetaminophen (NORCO)  MG per tablet 1 tablet  1 tablet Oral Q6H PRN Luevenia Sandifer, MD        [facility]R) extended release capsule 75 mg  75 mg Oral Daily with breakfast Aamir Croft MD        venlafaxine (EFFEXOR XR) extended release capsule 150 mg  150 mg Oral Daily with breakfast Aamir Croft MD        glucose chewable tablet 16 g  4 tablet Oral PRN Aamir Croft MD        dextrose bolus 10% 125 mL  125 mL IntraVENous PRN Aamir Croft MD        Or    dextrose bolus 10% 250 mL  250 mL IntraVENous PRN Aamir Croft MD        glucagon (rDNA) injection 1 mg  1 mg SubCUTAneous PRN Aamir Croft MD        dextrose 10 % infusion   IntraVENous Continuous PRN Aamir Croft MD        insulin lispro (HUMALOG) injection vial 0-16 Units  0-16 Units SubCUTAneous TID WC Aamir Croft MD        insulin lispro (HUMALOG) injection vial 0-4 Units  0-4 Units SubCUTAneous Nightly Aamir Croft MD        amLODIPine (NORVASC) tablet 5 mg  5 mg Oral Daily Aamir Croft MD   5 mg at 02/19/23 1902    lisinopril (PRINIVIL;ZESTRIL) tablet 20 mg  20 mg Oral Daily Aamir Croft MD   20 mg at 02/19/23 2031    hydroCHLOROthiazide (HYDRODIURIL) tablet 25 mg  25 mg Oral Daily Aamir Croft MD   25 mg at 02/19/23 1902    potassium chloride (KLOR-CON M) extended release tablet 20 mEq  20 mEq Oral Daily with breakfast Aamir Croft MD        ALPRAZolam [place]) tablet 0.5 mg  0.5 mg Oral Q8H PRN Aamir Croft MD   0.5 mg at 02/19/23 2218    zolpidem (AMBIEN) tablet 5 mg  5 mg Oral Nightly PRN Aamir Croft MD   5 mg at 02/19/23 2357    naloxone (NARCAN) injection 0.4 mg  0.4 mg IntraVENous PRN Aamir Croft MD           Social Determinants of Health:  Social Determinants of Health     Tobacco Use: Low Risk     Smoking Tobacco Use: Never    Smokeless Tobacco Use: Never    Passive Exposure: Not on file   Alcohol Use: Not At Risk    Frequency of Alcohol Consumption: Never    Average Number of Drinks: Patient does not drink    Frequency of Binge Drinking: Never   Financial Resource Strain: Not on file   Food Insecurity: Not on file   Transportation Needs: Not on file   Physical Activity: Not on file   Stress: Not on file   Social Connections: Not on file   Intimate Partner Violence: Not on file   Depression: Not on file   Housing Stability: Not on file     Good access to primary and/or specialist care. Reports generally stable financial, home and living environment. Review of Systems     Review of Systems   Constitutional:  Positive for diaphoresis. Negative for chills, fatigue and fever. HENT:  Negative for congestion. Respiratory:  Positive for chest tightness and shortness of breath. Negative for cough. Cardiovascular:  Negative for chest pain, palpitations and leg swelling. Gastrointestinal:  Positive for nausea and vomiting. Negative for abdominal pain and diarrhea. Genitourinary:  Negative for dysuria. Musculoskeletal:  Negative for neck pain. Neurological:  Negative for headaches. Physical Exam     Physical Exam  Constitutional:       General: She is not in acute distress. Appearance: Normal appearance. She is obese. She is not ill-appearing or toxic-appearing. HENT:      Head: Normocephalic and atraumatic. Right Ear: External ear normal.      Left Ear: External ear normal.      Nose: Nose normal. No congestion. Mouth/Throat:      Mouth: Mucous membranes are moist.      Pharynx: Oropharynx is clear. No oropharyngeal exudate or posterior oropharyngeal erythema. Eyes:      Conjunctiva/sclera: Conjunctivae normal.      Pupils: Pupils are equal, round, and reactive to light. Cardiovascular:      Rate and Rhythm: Normal rate and regular rhythm. Pulses: Normal pulses. Heart sounds: No murmur heard. Pulmonary:      Effort: Pulmonary effort is normal. No respiratory distress. Breath sounds: No wheezing or rales. Abdominal:      General: Abdomen is flat. There is no distension. Musculoskeletal:         General: No swelling or tenderness. Normal range of motion. Cervical back: Normal range of motion and neck supple. No tenderness. Right lower leg: No edema.       Left lower leg: No edema.   Skin:     General: Skin is warm. Capillary Refill: Capillary refill takes less than 2 seconds. Findings: No rash. Neurological:      General: No focal deficit present. Mental Status: She is alert. Cranial Nerves: No cranial nerve deficit. Sensory: No sensory deficit.        Lab and Diagnostic Study Results     Labs -  Recent Results (from the past 36 hour(s))   CBC with Auto Differential    Collection Time: 02/19/23 10:53 AM   Result Value Ref Range    WBC 11.9 4.6 - 13.2 K/uL    RBC 4.92 4.20 - 5.30 M/uL    Hemoglobin 13.5 12.0 - 16.0 g/dL    Hematocrit 41.0 35.0 - 45.0 %    MCV 83.3 78.0 - 100.0 FL    MCH 27.4 24.0 - 34.0 PG    MCHC 32.9 31.0 - 37.0 g/dL    RDW 13.5 11.6 - 14.5 %    Platelets 494 295 - 363 K/uL    MPV 9.3 9.2 - 11.8 FL    Nucleated RBCs 0.0 0  WBC    nRBC 0.00 0.00 - 0.01 K/uL    Seg Neutrophils 75 (H) 40 - 73 %    Lymphocytes 19 (L) 21 - 52 %    Monocytes 5 3 - 10 %    Eosinophils % 1 0 - 5 %    Basophils 1 0 - 2 %    Immature Granulocytes 0 0.0 - 0.5 %    Segs Absolute 8.8 (H) 1.8 - 8.0 K/UL    Absolute Lymph # 2.2 0.9 - 3.6 K/UL    Absolute Mono # 0.6 0.05 - 1.2 K/UL    Absolute Eos # 0.1 0.0 - 0.4 K/UL    Basophils Absolute 0.1 0.0 - 0.1 K/UL    Absolute Immature Granulocyte 0.0 0.00 - 0.04 K/UL    Differential Type AUTOMATED     Comprehensive Metabolic Panel    Collection Time: 02/19/23 10:53 AM   Result Value Ref Range    Sodium 137 136 - 145 mmol/L    Potassium 3.5 3.5 - 5.5 mmol/L    Chloride 102 100 - 111 mmol/L    CO2 29 21 - 32 mmol/L    Anion Gap 6 3.0 - 18 mmol/L    Glucose 250 (H) 74 - 99 mg/dL    BUN 12 7.0 - 18 MG/DL    Creatinine 1.12 0.6 - 1.3 MG/DL    Bun/Cre Ratio 11 (L) 12 - 20      Est, Glom Filt Rate 52 (L) >60 ml/min/1.73m2    Calcium 9.6 8.5 - 10.1 MG/DL    Total Bilirubin 0.6 0.2 - 1.0 MG/DL    ALT 15 13 - 56 U/L    AST 6 (L) 10 - 38 U/L    Alk Phosphatase 124 (H) 45 - 117 U/L    Total Protein 8.1 6.4 - 8.2 g/dL    Albumin 3.8 3.4 - 5.0 g/dL    Globulin 4.3 (H) 2.0 - 4.0 g/dL    Albumin/Globulin Ratio 0.9 0.8 - 1.7     Troponin    Collection Time: 02/19/23 10:53 AM   Result Value Ref Range    Troponin, High Sensitivity 12 0 - 54 ng/L   D-Dimer, Quantitative    Collection Time: 02/19/23 10:53 AM   Result Value Ref Range    D-Dimer, Quant 1.12 (H) <0.46 ug/ml(FEU)   Lipase    Collection Time: 02/19/23 10:53 AM   Result Value Ref Range    Lipase 135 73 - 393 U/L   Troponin    Collection Time: 02/19/23  1:00 PM   Result Value Ref Range    Troponin, High Sensitivity 12 0 - 54 ng/L   Troponin    Collection Time: 02/19/23  6:52 PM   Result Value Ref Range    Troponin, High Sensitivity 14 0 - 54 ng/L   TSH    Collection Time: 02/19/23  6:52 PM   Result Value Ref Range    TSH, 3RD GENERATION 1.09 0.36 - 3.74 uIU/mL   POCT Glucose    Collection Time: 02/19/23  7:01 PM   Result Value Ref Range    POC Glucose 172 (H) 70 - 110 mg/dL   POCT Glucose    Collection Time: 02/19/23 10:03 PM   Result Value Ref Range    POC Glucose 206 (H) 70 - 110 mg/dL   Troponin    Collection Time: 02/20/23  1:58 AM   Result Value Ref Range    Troponin, High Sensitivity 15 0 - 54 ng/L   Basic Metabolic Panel w/ Reflex to MG    Collection Time: 02/20/23  1:58 AM   Result Value Ref Range    Sodium 136 136 - 145 mmol/L    Potassium 3.1 (L) 3.5 - 5.5 mmol/L    Chloride 102 100 - 111 mmol/L    CO2 28 21 - 32 mmol/L    Anion Gap 6 3.0 - 18 mmol/L    Glucose 156 (H) 74 - 99 mg/dL    BUN 17 7.0 - 18 MG/DL    Creatinine 0.98 0.6 - 1.3 MG/DL    Bun/Cre Ratio 17 12 - 20      Est, Glom Filt Rate >60 >60 ml/min/1.73m2    Calcium 9.3 8.5 - 10.1 MG/DL   CBC with Auto Differential    Collection Time: 02/20/23  1:58 AM   Result Value Ref Range    WBC 14.0 (H) 4.6 - 13.2 K/uL    RBC 4.50 4. 20 - 5.30 M/uL    Hemoglobin 12.4 12.0 - 16.0 g/dL    Hematocrit 37.3 35.0 - 45.0 %    MCV 82.9 78.0 - 100.0 FL    MCH 27.6 24.0 - 34.0 PG    MCHC 33.2 31.0 - 37.0 g/dL    RDW 13.5 11.6 - 14.5 %    Platelets 390 135 - 420 K/uL    MPV 9.8 9.2 - 11.8 FL    Nucleated RBCs 0.0 0  WBC    nRBC 0.00 0.00 - 0.01 K/uL    Seg Neutrophils 74 (H) 40 - 73 %    Lymphocytes 17 (L) 21 - 52 %    Monocytes 7 3 - 10 %    Eosinophils % 1 0 - 5 %    Basophils 0 0 - 2 %    Immature Granulocytes 0 0.0 - 0.5 %    Segs Absolute 10.4 (H) 1.8 - 8.0 K/UL    Absolute Lymph # 2.4 0.9 - 3.6 K/UL    Absolute Mono # 1.0 0.05 - 1.2 K/UL    Absolute Eos # 0.1 0.0 - 0.4 K/UL    Basophils Absolute 0.1 0.0 - 0.1 K/UL    Absolute Immature Granulocyte 0.1 (H) 0.00 - 0.04 K/UL    Differential Type AUTOMATED     Magnesium    Collection Time: 02/20/23  1:58 AM   Result Value Ref Range    Magnesium 1.9 1.6 - 2.6 mg/dL           Radiologic Studies -   CTA CHEST W WO CONTRAST   Final Result      No evidence for pulmonary embolism. Clear lungs   Markedly ectatic ascending aorta, measuring 3.9 cm in diameter. Dilated main pulmonary artery, measuring 3.4 cm in axial diameter, suggesting   possible pulmonary arterial hypertension. Mild cardiomegaly. Mild coronary atherosclerosis. XR CHEST 1 VIEW   Final Result   No acute process. Please see the full medical record for comprehensive list of labs and imaging obtained during the visit. All labs and imaging studies were personally reviewed. Non-plain film images such as CT, Ultrasound and MRI are read by the radiologist. Plain radiographic images are visualized and preliminarily interpreted by the emergency physician with the below findings:    My intepretation(s) if applicable are below:     EKG interpretation(s): See ED course for my interpretation of EKG(s). Xray Interpretations(s): See ED course for my interpretation of EKG(s). Cardiac Monitor:    Rate: 65 bpm    Rhythm: Sinus Rhythm    Pulse Oximetry Analysis - 100% on RA    Medical Decision Making and ED Course   - I am the first and primary provider for this patient AND AM THE PRIMARY PROVIDER OF RECORD.     - I reviewed the vital signs, available nursing notes, past medical history, past surgical history, family history and social history. - Initial assessment performed. The patients presenting problems have been discussed, and the staff are in agreement with the care plan formulated and outlined with them. I have encouraged them to ask questions as they arise throughout their visit. Vital Signs-Reviewed the patient's vital signs. Vitals:    02/19/23 1942 02/19/23 2357 02/20/23 0344 02/20/23 0429   BP: (!) 144/83 (!) 147/71 (!) 133/58    Pulse: 71 76 66    Resp: 18 17 18 18   Temp: 98.3 °F (36.8 °C) 98.4 °F (36.9 °C) 98.3 °F (36.8 °C)    TempSrc: Oral Oral Oral    SpO2:  98% 98%    Weight:   239 lb 3.2 oz (108.5 kg)    Height:                 Records Reviewed:   Nursing Notes, available previous labs, imaging and medical records have been reviewed. Additional Considerations:        Provider Notes (Medical Decision Making):     MDM  Number of Diagnoses or Management Options  Acute chest pain  Aortic ectasia (HCC)  Atherosclerosis of native coronary artery of native heart with angina pectoris ([facility] [address]  Other chest pain  Diagnosis management comments:   DDx: R/o ACS, pneumonia, pneumothorax, pulmonary embolism, pleural effusion, aortic dissection, cardiac tamponade, musculoskeletal pain, gastroesophageal reflux, intestinal pain/gas, pericardial effusion/pericarditis, pancreatitis, biliary colic etc.             ED Course:         ED Course as of 02/20/23 0656   Sun Feb 19, 2023   1115 Interpretation normal sinus rhythm rate of 74 bpm, there is a slight left axis deviation incomplete right branch block, no ST elevation or depression. Small Q waves in lead aVL. Overall sinus rhythm with left axis and no overt ischemic change [NASIR]   1210 4X 81 ASA given PTA [NASIR]   1358 CBC is normal with no signs of significant anemia.   CMP/BMP is normal with no sign of significant alteration in kidney or liver function or significant hyponatremia or hypo or hyperkalemia. [NASIR]   1182 Waiting for CTA result. Requested unit clerk reach out to department to to assess for the cause of the delay. [NASIR]   4170 4:20 PM Minute Rounding Report: Patient reassessed by me. Patient reports resolved symptoms Updated on progress and results thus far and plan, as well as outstanding or pending results. [NASIR]   3503 She clarified that she received nitroglycerin prior to arrival and actually did relieve her pain. [NASIR]   8299 PA negative for pulmonary embolism. Ectatic aorta but no dissection. Cardiology paged to discuss the case. Patient received aspirin by EMS prior to coming in. [NASIR]   3007 Case was discussed with cardiology, Dr. Bradley Holbrook, recommended admission for stress test and echocardiogram. [NASIR]   693 2121 I discussed the plan with the patient, she was not agreeable and does not want to stay in the hospital.  She states she has to attend to her cat at home. Discussed with her at length the reasoning for the admission for stress test to rule out significant coronary artery disease. She thinks it is anxiety advised her that we certainly cannot diagnose anxiety in the context of significant risk factors for coronary artery disease. Discussed with her restratification and that she is in a higher risk category based on her heart score, risk factors and her presentation. She remains adamant that she does not want to stay in the hospital.  Recommended a minimum she contact her PCP and cardiologist (that I will give her) to discuss getting an outpatient stress done as soon as possible. I advised her that did not recommend that she be discharged and do outpatient stress given her risk factors for CAD and MI. They were not equivalent. She would like something for anxiety advised her would not prescribe anything significant until CAD could be ruled out. Advised her to continue her aspirin.   Her daughter stepped out of the room, we will talk to her daughter when she returns. [NASIR]   7593 After hospitalist and cardiologist came down to evaluate patient patient agreeable to staying in the hospital for cardiac observation and stress test. [NASIR]      ED Course User Index  [NASIR] Edgar Crespo DO       ------------------------------------------------------------------------------------------------------------        Consultations:     CONSULTS:  IP CONSULT TO CARDIOLOGY  IP CONSULT TO HOSPITALIST  IP CONSULT TO SOCIAL WORK    See the ED course section or MDM, if applicable for details on the content of consultations requested. Procedures and Critical Care       Performed by: Edgar Crespo DO    Procedures             CRITICAL CARE NOTE :  6:56 AM  CRITICAL CARE TIME: CRITICAL CARE NOTE:    I have spent 49 minutes of critical care time involved in lab review, consultations with specialist, family decision-making, and documentation. During this entire length of time I was immediately available to the patient. Critical Care: The reason for providing this level of medical care for this critically ill patient was due a critical illness that impaired one or more vital organ systems such that there was a high probability of imminent or life threatening deterioration in the patients condition. This care involved high complexity decision making to assess, manipulate, and support vital system functions, to treat this vital organ system failure and to prevent further life threatening deterioration of the patients condition. Time is exclusive of procedural and teaching time. DO Edgar Chamberlain DO        Disposition       Disposition: Admit to Medicine      Diagnosis     Clinical Impression:   1. Other chest pain    2. Acute chest pain    3. Aortic ectasia (HCC)    4. Atherosclerosis of native coronary artery of native heart with angina pectoris ([address]        PATIENT REFERRED TO:  No follow-up provider specified.     DISCHARGE MEDICATIONS:  Current Discharge Medication List          Attestations:    Parviz Santiago, DO    Please note that this dictation was completed with Autom[employer], the computer voice recognition software. Quite often unanticipated grammatical, syntax, homophones, and other interpretive errors are inadvertently transcribed by the computer software. Please disregard these errors. Please excuse any errors that have escaped final proofreading. Thank you.          Parviz Santiago DO  02/20/23 4484

## 2023-02-19 NOTE — Clinical Note
Prepped: bilateral groin and right radial. Site was prepped. Prepped with: ChloraPrep. Wet prep solution applied at: 2/22/2023 10:12 AM. Wet prep solution dried at: 2/22/2023 10:17 AM. Wet prep elapsed drying time: 5 mins. Patient was draped after wet prep solution dried.

## 2023-02-19 NOTE — Clinical Note
TRANSFER - OUT REPORT:     Verbal report given to: HOLDING ROOM RN. Report consisted of patient's Situation, Background, Assessment and   Recommendations(SBAR). Opportunity for questions and clarification was provided. Patient transported with a Registered Nurse. Patient transported to: holding area.

## 2023-02-19 NOTE — Clinical Note
TRANSFER - IN REPORT:     Verbal report received from: reyna adam RN. Report consisted of patient's Situation, Background, Assessment and   Recommendations(SBAR). Opportunity for questions and clarification was provided. Assessment completed upon patient's arrival to unit and care assumed. Patient transported with a Registered Nurse.  Pt obtained from room 338 by 2 nurses and brought to cath lab and prepped for procedure, consent signed and witnessed

## 2023-02-19 NOTE — CONSULTS
[facility] Consult Note      Patient: Susi Boston MRN: [id number]  SSN: [id number]    YOB: 1950  Age: 74 y.o. Sex: female    Date of Consultation: 2/19/2023    Reason for Consultation: Chest pain with multiple risk factors. HPI:  I was asked by Dr. Alin Spears to see this pleasant patient for chest pain. Susi Boston is a 73-year-old pleasant patient with history of diabetes, hypertension, hyperlipidemia, obesity came to the hospital with chest tightness associated with sweating and 1 episode of nausea and vomiting. Patient ischemic testing initially is negative but multiple risk factor. Patient pulmonary embolism is ruled out with CT scan. At this point patient is chest pain-free. No known history of CAD, PAD, DVT pulmonary embolism or TIA or stroke. No recent ischemic cardiac testing done, due to high risk history with risk factor patient was advised to get admitted for echocardiogram and stress test.             No past medical history on file. No past surgical history on file.   Current Facility-Administered Medications   Medication Dose Route Frequency    nitroGLYCERIN (NITROSTAT) SL tablet 0.4 mg  0.4 mg SubLINGual Q5 Min PRN    sodium chloride flush 0.9 % injection 5-40 mL  5-40 mL IntraVENous 2 times per day    sodium chloride flush 0.9 % injection 5-40 mL  5-40 mL IntraVENous PRN    0.9 % sodium chloride infusion   IntraVENous PRN    enoxaparin (LOVENOX) injection 40 mg  40 mg SubCUTAneous Daily    ondansetron (ZOFRAN-ODT) disintegrating tablet 4 mg  4 mg Oral Q8H PRN    Or    ondansetron (ZOFRAN) injection 4 mg  4 mg IntraVENous Q6H PRN    polyethylene glycol (GLYCOLAX) packet 17 g  17 g Oral Daily PRN    acetaminophen (TYLENOL) tablet 650 mg  650 mg Oral Q6H PRN    Or    acetaminophen (TYLENOL) suppository 650 mg  650 mg Rectal Q6H PRN    aspirin EC tablet 81 mg  81 mg Oral Daily    [START ON 2/20/2023] pantoprazole (PROTONIX) tablet 40 mg  40 mg Oral QAM AC    morphine (PF) injection 1 mg  1 mg IntraVENous Q4H PRN     No current outpatient medications on file. Allergies and Intolerances:   No Known Allergies    Family History:   No family history on file. Social History:   She  has no history on file for tobacco use. She  has no history on file for alcohol use. Review of Systems:     Review of Systems  Gen: No fever, chills, malaise, weight loss/gain. Heent: No headache, rhinorrhea, epistaxis, ear pain, hearing loss, sinus pain, neck pain/stiffness, sore throat. Heart: + chest pain, palpitations, CARSON, pnd, or orthopnea. Resp: No cough, hemoptysis, wheezing and shortness of breath. GI: + nausea, +vomiting, diarrhea, constipation, melena or hematochezia. : No urinary obstruction, dysuria or hematuria. Derm: No rash, new skin lesion or pruritis. Musc/skeletal: no bone or joint complains. Vasc: No edema, cyanosis or claudication. Endo: No heat/cold intolerance, no polyuria,polydipsia or polyphagia. Neuro: No unilateral weakness, numbness, tingling. No seizures. Heme: No easy bruising or bleeding. Physical:   Patient Vitals for the past 6 hrs:   Pulse Resp BP SpO2   02/19/23 1709 63 14 (!) 151/77 99 %   02/19/23 1702 62 13 -- 99 %   02/19/23 1701 62 14 -- 99 %   02/19/23 1531 62 15 -- 100 %   02/19/23 1516 61 16 (!) 159/72 99 %   02/19/23 1249 63 12 138/76 99 %         Exam:   General Appearance: Comfortable, not using accessory muscles of respiration. HEENT: KIANNA. HEAD: Atraumatic  NECK: No JVD, no thyroidomeglay. CAROTIDS:clear  LUNGS: Clear bilaterally. HEART: S1+S2     ABD: Non-tender, BS Audible    EXT: No edema, and no cysnosis. VASCULAR EXAM: Pulses are intact. PSYCHIATRIC EXAM: Mood is appropriate. MUSCULOSKELETAL: Grossly no joint deformity. NEUROLOGICAL: Motor and sensory sytem intact and Cranial nerves II-XII intact.     Review of Data:   LABS:   Lab Results   Component Value Date/Time    WBC 11.9 02/19/2023 10:53 AM    HGB 13.5 02/19/2023 10:53 AM    HCT 41.0 02/19/2023 10:53 AM     02/19/2023 10:53 AM     Lab Results   Component Value Date/Time     02/19/2023 10:53 AM    K 3.5 02/19/2023 10:53 AM     02/19/2023 10:53 AM    CO2 29 02/19/2023 10:53 AM    BUN 12 02/19/2023 10:53 AM             Impression / Plan:    Patient Active Problem List   Diagnosis    Chest pain    Type 2 diabetes mellitus (HCC)    Hyperlipidemia    Acute chest pain    Aortic ectasia (HCC)       A/P  Chest pain possible accelerated angina  AMI ruled out  Multiple risk factors for CAD including hypertension, hyperlipidemia, diabetes mellitus and obesity      Plan  I will get echocardiogram and stress test tomorrow  Discussed in detail with patient and granddaughter  Discussed with Dr. Lacy Jaquez  Thank you for allowing me to participate in the management of this pleasant patient. Please feel free to contact me if you have any questions or concerns.         Signed By: Liz Schneider MD     February 19, 2023

## 2023-02-19 NOTE — Clinical Note
Contrast Dose Calculator:   Patient's age: 74.   Patient's sex: Female. Patient weight (kg) = 109.8. Creatinine level (mg/dL) = 0.9. Creatinine clearance (mL/min): 95.06. Max Contrast dose per Creatinine Cl calculator = 213.88 mL.

## 2023-02-20 ENCOUNTER — APPOINTMENT (OUTPATIENT)
Facility: HOSPITAL | Age: 73
DRG: 287 | End: 2023-02-20
Payer: MEDICARE

## 2023-02-20 LAB
ANION GAP SERPL CALC-SCNC: 6 MMOL/L (ref 3–18)
APPEARANCE UR: ABNORMAL
BACTERIA URNS QL MICRO: ABNORMAL /HPF
BASOPHILS # BLD: 0.1 K/UL (ref 0–0.1)
BASOPHILS NFR BLD: 0 % (ref 0–2)
BILIRUB UR QL: NEGATIVE
BUN SERPL-MCNC: 17 MG/DL (ref 7–18)
BUN/CREAT SERPL: 17 (ref 12–20)
CALCIUM SERPL-MCNC: 9.3 MG/DL (ref 8.5–10.1)
CAOX CRY URNS QL MICRO: ABNORMAL
CHLORIDE SERPL-SCNC: 102 MMOL/L (ref 100–111)
CHOLEST SERPL-MCNC: 185 MG/DL
CO2 SERPL-SCNC: 28 MMOL/L (ref 21–32)
COLOR UR: YELLOW
CREAT SERPL-MCNC: 0.98 MG/DL (ref 0.6–1.3)
DIFFERENTIAL METHOD BLD: ABNORMAL
ECHO AO ASC DIAM: 3.5 CM
ECHO AO ASCENDING AORTA INDEX: 1.62 CM/M2
ECHO AO ROOT DIAM: 2.9 CM
ECHO AO ROOT INDEX: 1.34 CM/M2
ECHO AR MAX VEL PISA: 3.5 M/S
ECHO AV AREA PEAK VELOCITY: 2.3 CM2
ECHO AV AREA VTI: 2.6 CM2
ECHO AV AREA/BSA PEAK VELOCITY: 1.1 CM2/M2
ECHO AV AREA/BSA VTI: 1.2 CM2/M2
ECHO AV MEAN GRADIENT: 4 MMHG
ECHO AV MEAN VELOCITY: 0.9 M/S
ECHO AV PEAK GRADIENT: 7 MMHG
ECHO AV PEAK VELOCITY: 1.3 M/S
ECHO AV REGURGITANT PHT: 1258.7 MILLISECOND
ECHO AV VELOCITY RATIO: 0.77
ECHO AV VTI: 25.8 CM
ECHO BSA: 2.25 M2
ECHO LA DIAMETER INDEX: 1.67 CM/M2
ECHO LA DIAMETER: 3.6 CM
ECHO LA TO AORTIC ROOT RATIO: 1.24
ECHO LA VOL 2C: 39 ML (ref 22–52)
ECHO LA VOL 2C: 42 ML (ref 22–52)
ECHO LA VOL 4C: 71 ML (ref 22–52)
ECHO LA VOL 4C: 76 ML (ref 22–52)
ECHO LA VOL BP: 53 ML (ref 22–52)
ECHO LA VOL/BSA BIPLANE: 25 ML/M2 (ref 16–34)
ECHO LA VOLUME AREA LENGTH: 56 ML
ECHO LA VOLUME INDEX AREA LENGTH: 26 ML/M2 (ref 16–34)
ECHO LV E' LATERAL VELOCITY: 11 CM/S
ECHO LV E' SEPTAL VELOCITY: 5 CM/S
ECHO LV EDV A2C: 67 ML
ECHO LV EDV A4C: 78 ML
ECHO LV EDV BP: 73 ML (ref 56–104)
ECHO LV EDV INDEX A4C: 36 ML/M2
ECHO LV EDV INDEX BP: 34 ML/M2
ECHO LV EDV NDEX A2C: 31 ML/M2
ECHO LV EJECTION FRACTION A2C: 62 %
ECHO LV EJECTION FRACTION A4C: 74 %
ECHO LV EJECTION FRACTION BIPLANE: 66 % (ref 55–100)
ECHO LV ESV A2C: 25 ML
ECHO LV ESV A4C: 20 ML
ECHO LV ESV BP: 25 ML (ref 19–49)
ECHO LV ESV INDEX A2C: 12 ML/M2
ECHO LV ESV INDEX A4C: 9 ML/M2
ECHO LV ESV INDEX BP: 12 ML/M2
ECHO LV FRACTIONAL SHORTENING: 36 % (ref 28–44)
ECHO LV INTERNAL DIMENSION DIASTOLE INDEX: 2.08 CM/M2
ECHO LV INTERNAL DIMENSION DIASTOLIC: 4.5 CM (ref 3.9–5.3)
ECHO LV INTERNAL DIMENSION SYSTOLIC INDEX: 1.34 CM/M2
ECHO LV INTERNAL DIMENSION SYSTOLIC: 2.9 CM
ECHO LV IVSD: 1.3 CM (ref 0.6–0.9)
ECHO LV MASS 2D: 210.2 G (ref 67–162)
ECHO LV MASS INDEX 2D: 97.3 G/M2 (ref 43–95)
ECHO LV POSTERIOR WALL DIASTOLIC: 1.2 CM (ref 0.6–0.9)
ECHO LV RELATIVE WALL THICKNESS RATIO: 0.53
ECHO LVOT AREA: 3.1 CM2
ECHO LVOT AV VTI INDEX: 0.82
ECHO LVOT DIAM: 2 CM
ECHO LVOT MEAN GRADIENT: 2 MMHG
ECHO LVOT PEAK GRADIENT: 4 MMHG
ECHO LVOT PEAK VELOCITY: 1 M/S
ECHO LVOT STROKE VOLUME INDEX: 30.7 ML/M2
ECHO LVOT SV: 66.3 ML
ECHO LVOT VTI: 21.1 CM
ECHO MV A VELOCITY: 0.97 M/S
ECHO MV A VELOCITY: 1.02 M/S
ECHO MV E DECELERATION TIME (DT): 182 MS
ECHO MV E VELOCITY: 0.65 M/S
ECHO MV E VELOCITY: 0.68 M/S
ECHO PULMONARY ARTERY END DIASTOLIC PRESSURE: 3 MMHG
ECHO PV ACCELERATION TIME (AT): 37.1 MS
ECHO PV MAX VELOCITY: 1 M/S
ECHO PV PEAK GRADIENT: 4 MMHG
ECHO PV REGURGITANT MAX VELOCITY: 0.9 M/S
ECHO RV FREE WALL PEAK S': 19 CM/S
ECHO RV INTERNAL DIMENSION: 2.5 CM
ECHO RV TAPSE: 2 CM (ref 1.7–?)
EKG ATRIAL RATE: 74 BPM
EKG DIAGNOSIS: NORMAL
EKG P AXIS: 47 DEGREES
EKG P-R INTERVAL: 180 MS
EKG Q-T INTERVAL: 394 MS
EKG QRS DURATION: 98 MS
EKG QTC CALCULATION (BAZETT): 437 MS
EKG R AXIS: -49 DEGREES
EKG T AXIS: 73 DEGREES
EKG VENTRICULAR RATE: 74 BPM
EOSINOPHIL # BLD: 0.1 K/UL (ref 0–0.4)
EOSINOPHIL NFR BLD: 1 % (ref 0–5)
EPITH CASTS URNS QL MICRO: ABNORMAL /LPF (ref 0–5)
ERYTHROCYTE [DISTWIDTH] IN BLOOD BY AUTOMATED COUNT: 13.5 % (ref 11.6–14.5)
EST. AVERAGE GLUCOSE BLD GHB EST-MCNC: 146 MG/DL
GLUCOSE BLD STRIP.AUTO-MCNC: 138 MG/DL (ref 70–110)
GLUCOSE BLD STRIP.AUTO-MCNC: 163 MG/DL (ref 70–110)
GLUCOSE BLD STRIP.AUTO-MCNC: 163 MG/DL (ref 70–110)
GLUCOSE BLD STRIP.AUTO-MCNC: 173 MG/DL (ref 70–110)
GLUCOSE BLD STRIP.AUTO-MCNC: 193 MG/DL (ref 70–110)
GLUCOSE SERPL-MCNC: 156 MG/DL (ref 74–99)
GLUCOSE UR STRIP.AUTO-MCNC: NEGATIVE MG/DL
HBA1C MFR BLD: 6.7 % (ref 4.2–5.6)
HCT VFR BLD AUTO: 37.3 % (ref 35–45)
HDLC SERPL-MCNC: 57 MG/DL (ref 40–60)
HDLC SERPL: 3.2 (ref 0–5)
HGB BLD-MCNC: 12.4 G/DL (ref 12–16)
HGB UR QL STRIP: NEGATIVE
HYALINE CASTS URNS QL MICRO: ABNORMAL /LPF (ref 0–2)
IMM GRANULOCYTES # BLD AUTO: 0.1 K/UL (ref 0–0.04)
IMM GRANULOCYTES NFR BLD AUTO: 0 % (ref 0–0.5)
KETONES UR QL STRIP.AUTO: ABNORMAL MG/DL
LDLC SERPL CALC-MCNC: 117 MG/DL (ref 0–100)
LEUKOCYTE ESTERASE UR QL STRIP.AUTO: ABNORMAL
LIPID PANEL: ABNORMAL
LV EF: 63 %
LVEF MODALITY: ABNORMAL
LYMPHOCYTES # BLD: 2.4 K/UL (ref 0.9–3.6)
LYMPHOCYTES NFR BLD: 17 % (ref 21–52)
MAGNESIUM SERPL-MCNC: 1.9 MG/DL (ref 1.6–2.6)
MCH RBC QN AUTO: 27.6 PG (ref 24–34)
MCHC RBC AUTO-ENTMCNC: 33.2 G/DL (ref 31–37)
MCV RBC AUTO: 82.9 FL (ref 78–100)
MONOCYTES # BLD: 1 K/UL (ref 0.05–1.2)
MONOCYTES NFR BLD: 7 % (ref 3–10)
MUCOUS THREADS URNS QL MICRO: ABNORMAL /LPF
NEUTS SEG # BLD: 10.4 K/UL (ref 1.8–8)
NEUTS SEG NFR BLD: 74 % (ref 40–73)
NITRITE UR QL STRIP.AUTO: POSITIVE
NRBC # BLD: 0 K/UL (ref 0–0.01)
NRBC BLD-RTO: 0 PER 100 WBC
PH UR STRIP: 6 (ref 5–8)
PLATELET # BLD AUTO: 390 K/UL (ref 135–420)
PMV BLD AUTO: 9.8 FL (ref 9.2–11.8)
POTASSIUM SERPL-SCNC: 3.1 MMOL/L (ref 3.5–5.5)
PROT UR STRIP-MCNC: 30 MG/DL
RBC # BLD AUTO: 4.5 M/UL (ref 4.2–5.3)
RBC #/AREA URNS HPF: ABNORMAL /HPF (ref 0–5)
SODIUM SERPL-SCNC: 136 MMOL/L (ref 136–145)
SP GR UR REFRACTOMETRY: >1.03 (ref 1–1.03)
TRIGL SERPL-MCNC: 55 MG/DL
TROPONIN I SERPL HS-MCNC: 14 NG/L (ref 0–54)
TROPONIN I SERPL HS-MCNC: 15 NG/L (ref 0–54)
TROPONIN I SERPL HS-MCNC: 15 NG/L (ref 0–54)
TROPONIN I SERPL HS-MCNC: 20 NG/L (ref 0–54)
TSH SERPL DL<=0.05 MIU/L-ACNC: 1.09 UIU/ML (ref 0.36–3.74)
UROBILINOGEN UR QL STRIP.AUTO: 1 EU/DL (ref 0.2–1)
VLDLC SERPL CALC-MCNC: 11 MG/DL
WBC # BLD AUTO: 14 K/UL (ref 4.6–13.2)
WBC URNS QL MICRO: ABNORMAL /HPF (ref 0–5)

## 2023-02-20 PROCEDURE — 1100000003 HC PRIVATE W/ TELEMETRY

## 2023-02-20 PROCEDURE — 6370000000 HC RX 637 (ALT 250 FOR IP): Performed by: HOSPITALIST

## 2023-02-20 PROCEDURE — 84484 ASSAY OF TROPONIN QUANT: CPT

## 2023-02-20 PROCEDURE — 83735 ASSAY OF MAGNESIUM: CPT

## 2023-02-20 PROCEDURE — 81001 URINALYSIS AUTO W/SCOPE: CPT

## 2023-02-20 PROCEDURE — 82962 GLUCOSE BLOOD TEST: CPT

## 2023-02-20 PROCEDURE — 2580000003 HC RX 258: Performed by: HOSPITALIST

## 2023-02-20 PROCEDURE — 85025 COMPLETE CBC W/AUTO DIFF WBC: CPT

## 2023-02-20 PROCEDURE — 36415 COLL VENOUS BLD VENIPUNCTURE: CPT

## 2023-02-20 PROCEDURE — 6360000002 HC RX W HCPCS: Performed by: HOSPITALIST

## 2023-02-20 PROCEDURE — 93325 DOPPLER ECHO COLOR FLOW MAPG: CPT

## 2023-02-20 PROCEDURE — 80048 BASIC METABOLIC PNL TOTAL CA: CPT

## 2023-02-20 RX ORDER — POTASSIUM CHLORIDE 20 MEQ/1
40 TABLET, EXTENDED RELEASE ORAL ONCE
Status: COMPLETED | OUTPATIENT
Start: 2023-02-20 | End: 2023-02-20

## 2023-02-20 RX ADMIN — LISINOPRIL 20 MG: 20 TABLET ORAL at 08:52

## 2023-02-20 RX ADMIN — VENLAFAXINE HYDROCHLORIDE 150 MG: 150 CAPSULE, EXTENDED RELEASE ORAL at 08:54

## 2023-02-20 RX ADMIN — AMLODIPINE BESYLATE 5 MG: 5 TABLET ORAL at 08:53

## 2023-02-20 RX ADMIN — ASPIRIN 81 MG: 81 TABLET, COATED ORAL at 08:53

## 2023-02-20 RX ADMIN — ENOXAPARIN SODIUM 40 MG: 100 INJECTION SUBCUTANEOUS at 08:53

## 2023-02-20 RX ADMIN — HYDROCHLOROTHIAZIDE 25 MG: 25 TABLET ORAL at 08:53

## 2023-02-20 RX ADMIN — MORPHINE SULFATE 1 MG: 2 INJECTION, SOLUTION INTRAMUSCULAR; INTRAVENOUS at 23:39

## 2023-02-20 RX ADMIN — SODIUM CHLORIDE, PRESERVATIVE FREE 10 ML: 5 INJECTION INTRAVENOUS at 20:28

## 2023-02-20 RX ADMIN — ATORVASTATIN CALCIUM 20 MG: 20 TABLET, FILM COATED ORAL at 20:29

## 2023-02-20 RX ADMIN — VENLAFAXINE HYDROCHLORIDE 75 MG: 75 CAPSULE, EXTENDED RELEASE ORAL at 08:52

## 2023-02-20 RX ADMIN — POTASSIUM CHLORIDE 40 MEQ: 1500 TABLET, EXTENDED RELEASE ORAL at 16:56

## 2023-02-20 RX ADMIN — SODIUM CHLORIDE, PRESERVATIVE FREE 10 ML: 5 INJECTION INTRAVENOUS at 08:55

## 2023-02-20 RX ADMIN — POTASSIUM CHLORIDE 20 MEQ: 1500 TABLET, EXTENDED RELEASE ORAL at 08:52

## 2023-02-20 RX ADMIN — ONDANSETRON 4 MG: 4 TABLET, ORALLY DISINTEGRATING ORAL at 03:56

## 2023-02-20 RX ADMIN — PANTOPRAZOLE SODIUM 40 MG: 40 TABLET, DELAYED RELEASE ORAL at 06:08

## 2023-02-20 RX ADMIN — MORPHINE SULFATE 1 MG: 2 INJECTION, SOLUTION INTRAMUSCULAR; INTRAVENOUS at 04:29

## 2023-02-20 ASSESSMENT — PAIN SCALES - GENERAL
PAINLEVEL_OUTOF10: 7
PAINLEVEL_OUTOF10: 0
PAINLEVEL_OUTOF10: 7
PAINLEVEL_OUTOF10: 4

## 2023-02-20 ASSESSMENT — PAIN DESCRIPTION - DESCRIPTORS
DESCRIPTORS: ACHING;DISCOMFORT;PRESSURE
DESCRIPTORS: ACHING
DESCRIPTORS: ACHING;DISCOMFORT

## 2023-02-20 ASSESSMENT — PAIN - FUNCTIONAL ASSESSMENT
PAIN_FUNCTIONAL_ASSESSMENT: ACTIVITIES ARE NOT PREVENTED

## 2023-02-20 ASSESSMENT — PAIN DESCRIPTION - PAIN TYPE
TYPE: OTHER (COMMENT)
TYPE: ACUTE PAIN

## 2023-02-20 ASSESSMENT — PAIN DESCRIPTION - LOCATION
LOCATION: BACK

## 2023-02-20 ASSESSMENT — PAIN DESCRIPTION - ORIENTATION
ORIENTATION: LOWER

## 2023-02-20 ASSESSMENT — PAIN DESCRIPTION - FREQUENCY: FREQUENCY: OTHER (COMMENT)

## 2023-02-20 NOTE — PROGRESS NOTES
Hospitalist Progress Note-critical care note     Patient: Garry Gomez MRN: [id number]  CSN: [id number]    YOB: 1950  Age: 74 y.o. Sex: female    DOA: 2/19/2023 LOS:  LOS: 1 day            Chief complaint: chest pain, dm ,htn hld     Assessment/Plan         Active Hospital Problems    Diagnosis Date Noted    Chest pain [R07.9] 02/19/2023     Priority: Medium    Type 2 diabetes mellitus ([address] [E11.9] 02/19/2023     Priority: Medium    Hyperlipidemia [E78.5] 02/19/2023     Priority: Medium    Acute chest pain [R07.9] 02/19/2023     Priority: Medium    Aortic ectasia ([address] [I77.819] 02/19/2023     Priority: Medium    Depression [F32. A] 02/19/2023     Priority: Medium    Chronic pain [G89.29] 02/19/2023     Priority: Medium        Pt is admitted for ischemic cardiac work up . Chest pain-no chest pain   Cardiac monitor, ce trend need to r/o acs   Ekg: no st change at this point,  morphine /nitro prn for chest pain  Cardiology seeing pt   Echo done with normal ef and stress test  tomorrow         DM type II , used to take insulin , now on semaglutide (OZEMPIC   ssi, diabetic diet , hypoglycemia protocol        HTN, accelerated  Continue home medication. Depression and anxiety   On effexor highest dose at home   Xanax at home as needed   Also on ambien at night      Hld check lipid profile continue lipitor      Aortic ectasia  Need to follow-up with cardiologist     Chronic pain continue home pain medication  Narcan as needed    K replacement     Wbc elevated no fever -continue monitoring, will check ua ,cxr good      Subjective I am [facility]     Unable to have stress test done today due to pt load     Disposition :tbd   Review of systems:    General: No fevers or chills. Cardiovascular: No chest pain or pressure. No palpitations. Pulmonary: No shortness of breath. Gastrointestinal: No nausea, vomiting.      Vital signs/Intake and Output:  Visit Vitals  /65   Pulse 82   Temp 98.6 °F (37 °C) (Oral)   Resp 18   Ht 5' 6\" (1.676 m)   Wt 239 lb (108.4 kg)   SpO2 97%   BMI 38.58 kg/m²     Current Shift:  02/20 0701 - 02/20 1900  In: 60 [P.O.:60]  Out: -   Last three shifts:  No intake/output data recorded. Physical Exam:  General: WD, WN. Alert, cooperative, no acute distress    HEENT: NC, Atraumatic. PERRLA, anicteric sclerae. Lungs: CTA Bilaterally. No Wheezing/Rhonchi/Rales. Heart:  Regular  rhythm,  No murmur, No Rubs, No Gallops  Abdomen: Soft, Non distended, Non tender. +Bowel sounds,   Extremities: No c/c/e  Psych:   Not anxious or agitated. Neurologic:  No acute neurological deficit. Labs: Results:       Chemistry Recent Labs     02/19/23  1053 02/20/23  0158    136   K 3.5 3.1*    102   CO2 29 28   BUN 12 17   GLOB 4.3*  --       CBC w/Diff Recent Labs     02/19/23  1053 02/20/23  0158   WBC 11.9 14.0*   RBC 4.92 4.50   HGB 13.5 12.4   HCT 41.0 37.3    390      Cardiac Enzymes No results for input(s): CPK, KIRSTY in the last 72 hours. Invalid input(s): CKRMB, CKND1, TROIP   Coagulation No results for input(s): INR, APTT in the last 72 hours. Invalid input(s): PTP    Lipid Panel Lab Results   Component Value Date/Time    CHOL 185 02/19/2023 06:52 PM    HDL 57 02/19/2023 06:52 PM      BNP Invalid input(s): BNPP   Liver Enzymes No results for input(s): TP, ALB in the last 72 hours. Invalid input(s): TBIL, AP, SGOT, GPT, DBIL   Thyroid Studies No results found for: T4, TSH     Procedures/imaging: see electronic medical records for all procedures/Xrays and details which were not copied into this note but were reviewed prior to creation of Plan    CTA CHEST W WO CONTRAST    Result Date: 2/19/2023  EXAM: CTA CHEST W WO CONTRAST INDICATION: Chest Pain and positive D Dimer, PE protocol COMPARISON: None. CONTRAST: 100 mL of Isovue-370. TECHNIQUE: Precontrast  images were obtained to localize the volume for acquisition.  Multislice helical CT arteriography was performed from the diaphragm to the thoracic inlet during uneventful rapid bolus intravenous contrast administration. Lung and soft tissue windows were generated. Coronal and sagittal images were generated and 3D post processing consisting of coronal maximum intensity images was performed. CT dose reduction was achieved through use of a standardized protocol tailored for this examination and automatic exposure control for dose modulation. FINDINGS: The lungs are clear of mass, nodule, airspace disease or edema. The pulmonary arteries are well enhanced and no pulmonary emboli are identified. Main pulmonary artery measures 3.4 cm in axial diameter. Mild cardiomegaly. There is no mediastinal or hilar adenopathy or mass. Right paratracheal node is borderline at 9 mm. The aorta enhances normally without evidence of aneurysm or dissection. The ascending aorta measures 3.9 cm in cross-sectional diameter. Mild coronary artery calcification. The visualized portions of the upper abdominal organs are remarkable for a left mid renal macrolobulated 2.5 cm cyst. Cholecystectomy clips. No evidence for pulmonary embolism. Clear lungs Markedly ectatic ascending aorta, measuring 3.9 cm in diameter. Dilated main pulmonary artery, measuring 3.4 cm in axial diameter, suggesting possible pulmonary arterial hypertension. Mild cardiomegaly. Mild coronary atherosclerosis. XR CHEST 1 VIEW    Result Date: 2/19/2023  EXAM:  XR CHEST 1 VIEW INDICATION: Chest pain COMPARISON: None. TECHNIQUE: Portable AP upright chest view at 1121 hours FINDINGS: The cardiomediastinal contours are within normal limits. The lungs and pleural spaces are clear. There is no pneumothorax. The bones and upper abdomen are unremarkable. No acute process.      Transthoracic echocardiogram (TTE) limited with contrast, bubble, strain, and 3D PRN    Result Date: 2/20/2023    Left Ventricle: Normal left ventricular systolic function with a visually estimated EF of 60 - 65%. Left ventricle size is normal. Mildly increased wall thickness. Normal wall motion. Normal diastolic function.   Aortic Valve: Trileaflet valve. Mild regurgitation.       ALEXIS SWEET MD

## 2023-02-20 NOTE — CARE COORDINATION
D/c plan: Home w/ family assistance. Family to transport. CM spoke w/ pt. Confirmed information on the pt's face sheet. Pt lives at home w/ her son. Pt is independent in all ADLs at baseline without an assistive device. Pt is independent in all IDLs. Pt doesn't drive. Her son or her granddaughter drives her. CM confirmed pt's PCP is Dr. Tiago Mueller. Pt states she doesn't have any specialists. CM anticipates d/c home in 24 to 48 hours. No CM needs identified. CM will continue to follow. Case Management Assessment  Initial Evaluation    Date/Time of Evaluation: 2/20/2023 4:56 PM  Assessment Completed by: Sabrina Smith RN    If patient is discharged prior to next notation, then this note serves as note for discharge by case management. Patient Name: Garry Gomez                   YOB: 1950  Diagnosis: Other chest pain [R07.89]  Aortic ectasia (HCC) [I77.819]  Acute chest pain [R07.9]  Atherosclerosis of native coronary artery of native heart with angina pectoris ([facility] [I25.119]                   Date / Time: 2/19/2023 10:46 AM    Patient Admission Status: Inpatient   Readmission Risk (Low < 19, Mod (19-27), High > 27): Readmission Risk Score: 8    Current PCP: None Provider  PCP verified by CM? Yes (Dr. Ordoñez Sic)    Chart Reviewed: Yes      History Provided by: Patient  Patient Orientation: Alert and Oriented    Patient Cognition: Alert    Hospitalization in the last 30 days (Readmission):  No    If yes, Readmission Assessment in CM Navigator will be completed.     Advance Directives:      Code Status: Full Code   Patient's Primary Decision Maker is:        Discharge Planning:    Patient lives with: Children Type of Home: Apartment  Primary Care Giver: Self  Patient Support Systems include: Children, Family Members (Son and granddaughter)   Current Financial resources:    Current community resources:    Current services prior to admission: None            Current DME:              Type of Home Care services:  None    ADLS  Prior functional level: Independent in ADLs/IADLs (Pt doesn't drive.)  Current functional level: Independent in ADLs/IADLs    PT AM-PAC:   /24  OT AM-PAC:   /24    Family can provide assistance at DC: Yes  Would you like Case Management to discuss the discharge plan with any other family members/significant others, and if so, who? Plans to Return to Present Housing: Yes  Other Identified Issues/Barriers to RETURNING to current housing: No  Potential Assistance needed at discharge: N/A            Potential DME:    Patient expects to discharge to: [address] [place] [address] for transportation at discharge: Family    Financial    Payor: [address] [facility] / Plan: [facility] / Product Type: *No Product type* /     Does insurance require precert for SNF: Yes    Potential assistance Purchasing Medications: No  Meds-to-Beds request: Yes      [facility] [address] [address]  Phone: [phone] Fax: [phone]      Notes:    Factors facilitating achievement of predicted outcomes: Family support, Motivated, Cooperative, and Pleasant    Barriers to discharge: None        The Plan for Transition of Care is related to the following treatment goals of Other chest pain [R07.89]  Aortic ectasia (HCC) [I77.819]  Acute chest pain [R07.9]  Atherosclerosis of native coronary artery of native heart with angina pectoris ([facility] [address] [V20.668]    IF APPLICABLE: The Patient and/or patient representative Rafita Hodgson and her family were provided with a choice of provider and agrees with the discharge plan.  Freedom of choice list with basic dialogue that supports the patient's individualized plan of care/goals and shares the quality data associated with the providers was provided to:     Patient Representative Name:       The Patient and/or Patient Representative Agree with the Discharge Plan?       Kaylee Adams RN  Case Management Department

## 2023-02-20 NOTE — PROGRESS NOTES
Cardiology Progress Note        Patient: Brandyn Tadeo        Sex: female          DOA: 2/19/2023  YOB: 1950      Age:  74 y.o.        LOS:  LOS: 1 day   Assessment/Plan     Principal Problem:    Chest pain  Active Problems:    Type 2 diabetes mellitus ([address]    Hyperlipidemia    Acute chest pain    Aortic ectasia (HCC)    Depression    Chronic pain  Resolved Problems:    * No resolved hospital problems. *      Plan:      TRANSTHORACIC ECHOCARDIOGRAM (TTE) LIMITED (CONTRAST/BUBBLE/3D PRN) 02/20/2023  2:16 PM (Final)    Interpretation Summary    Left Ventricle: Normal left ventricular systolic function with a visually estimated EF of 60 - 65%. Left ventricle size is normal. Mildly increased wall thickness. Normal wall motion. Normal diastolic function. Aortic Valve: Trileaflet valve. Mild regurgitation. Signed by: Radha Craig MD on 2/20/2023  2:16 PM      Chest pain concerning for angina with multiple risk factor  Currently hemodynamically stable  Above result of echocardiogram discussed with patient  Patient is scheduled for stress test tomorrow                      Subjective:    cc:    Chest pain        REVIEW OF SYSTEMS:     General: No fevers or chills. Cardiovascular: No chest pain or pressure. No palpitations. No ankle swelling  Pulmonary: No SOB, orthopnea, PND  Gastrointestinal: No nausea, vomiting or diarrhea      Objective:      Visit Vitals  /71   Pulse 69   Temp 98.4 °F (36.9 °C) (Oral)   Resp 18   Ht 5' 6\" (1.676 m)   Wt 239 lb (108.4 kg)   SpO2 98%   BMI 38.58 kg/m²     Body mass index is 38.58 kg/m². Physical Exam:  General Appearance: Comfortable, not using accessory muscles of respiration. NECK: No JVD, no thyroidomeglay. LUNGS: Clear bilaterally. HEART: S1+S2 audible,    ABD: Non-tender, BS Audible    EXT: No edema, and no cysnosis. VASCULAR EXAM: Pulses are intact. PSYCHIATRIC EXAM: Mood is appropriate.     Medication:  Current Facility-Administered Medications   Medication Dose Route Frequency    nitroGLYCERIN (NITROSTAT) SL tablet 0.4 mg  0.4 mg SubLINGual Q5 Min PRN    sodium chloride flush 0.9 % injection 5-40 mL  5-40 mL IntraVENous 2 times per day    sodium chloride flush 0.9 % injection 5-40 mL  5-40 mL IntraVENous PRN    0.9 % sodium chloride infusion   IntraVENous PRN    enoxaparin (LOVENOX) injection 40 mg  40 mg SubCUTAneous Daily    ondansetron (ZOFRAN-ODT) disintegrating tablet 4 mg  4 mg Oral Q8H PRN    Or    ondansetron (ZOFRAN) injection 4 mg  4 mg IntraVENous Q6H PRN    polyethylene glycol (GLYCOLAX) packet 17 g  17 g Oral Daily PRN    acetaminophen (TYLENOL) tablet 650 mg  650 mg Oral Q6H PRN    Or    acetaminophen (TYLENOL) suppository 650 mg  650 mg Rectal Q6H PRN    aspirin EC tablet 81 mg  81 mg Oral Daily    pantoprazole (PROTONIX) tablet 40 mg  40 mg Oral QAM AC    morphine (PF) injection 1 mg  1 mg IntraVENous Q4H PRN    atorvastatin (LIPITOR) tablet 20 mg  20 mg Oral Nightly    HYDROcodone-acetaminophen (NORCO)  MG per tablet 1 tablet  1 tablet Oral Q6H PRN    venlafaxine (EFFEXOR XR) extended release capsule 75 mg  75 mg Oral Daily with breakfast    venlafaxine (EFFEXOR XR) extended release capsule 150 mg  150 mg Oral Daily with breakfast    glucose chewable tablet 16 g  4 tablet Oral PRN    dextrose bolus 10% 125 mL  125 mL IntraVENous PRN    Or    dextrose bolus 10% 250 mL  250 mL IntraVENous PRN    glucagon (rDNA) injection 1 mg  1 mg SubCUTAneous PRN    dextrose 10 % infusion   IntraVENous Continuous PRN    insulin lispro (HUMALOG) injection vial 0-16 Units  0-16 Units SubCUTAneous TID WC    insulin lispro (HUMALOG) injection vial 0-4 Units  0-4 Units SubCUTAneous Nightly    amLODIPine (NORVASC) tablet 5 mg  5 mg Oral Daily    lisinopril (PRINIVIL;ZESTRIL) tablet 20 mg  20 mg Oral Daily    hydroCHLOROthiazide (HYDRODIURIL) tablet 25 mg  25 mg Oral Daily    potassium chloride (KLOR-CON M) extended release tablet 20 mEq  20 mEq Oral Daily with breakfast    ALPRAZolam (XANAX) tablet 0.5 mg  0.5 mg Oral Q8H PRN    zolpidem (AMBIEN) tablet 5 mg  5 mg Oral Nightly PRN    naloxone (NARCAN) injection 0.4 mg  0.4 mg IntraVENous PRN               Lab/Data Reviewed:  Procedures/imaging: see electronic medical records for all procedures/Xrays   and details which were not copied into this note but were reviewed prior to creation of Plan        Recent Labs     02/19/23  1053 02/20/23  0158   WBC 11.9 14.0*   HGB 13.5 12.4   HCT 41.0 37.3    390     Recent Labs     02/19/23  1053 02/20/23  0158    136   K 3.5 3.1*    102   CO2 29 28   BUN 12 17       RADIOLOGY:  CTA CHEST W WO CONTRAST    Result Date: 2/19/2023  No evidence for pulmonary embolism. Clear lungs Markedly ectatic ascending aorta, measuring 3.9 cm in diameter. Dilated main pulmonary artery, measuring 3.4 cm in axial diameter, suggesting possible pulmonary arterial hypertension. Mild cardiomegaly. Mild coronary atherosclerosis.    XR CHEST 1 VIEW    Result Date: 2/19/2023  No acute process.           Cardiology Procedures:   No results found for this or any previous visit. 02/19/23    TRANSTHORACIC ECHOCARDIOGRAM (TTE) LIMITED (CONTRAST/BUBBLE/3D PRN) 02/20/2023  2:16 PM (Final)    Interpretation Summary    Left Ventricle: Normal left ventricular systolic function with a visually estimated EF of 60 - 65%. Left ventricle size is normal. Mildly increased wall thickness. Normal wall motion. Normal diastolic function.    Aortic Valve: Trileaflet valve. Mild regurgitation.    Signed by: Chris Latham MD on 2/20/2023  2:16 PM    No results found for this or any previous visit.                Signed By: CHRIS LATHAM MD     February 20, 2023

## 2023-02-20 NOTE — PLAN OF CARE
Problem: Discharge Planning  Goal: Discharge to home or other facility with appropriate resources  2/19/2023 2304 by Lalita Matute RN  Outcome: Progressing  Flowsheets (Taken 2/19/2023 2002)  Discharge to home or other facility with appropriate resources:   Identify barriers to discharge with patient and caregiver   Identify discharge learning needs (meds, wound care, etc)  2/19/2023 1941 by Duane Gutiérrez RN  Outcome: Progressing     Problem: Safety - Adult  Goal: Free from fall injury  2/19/2023 2304 by Lalita Matute RN  Outcome: Progressing  [address] (Taken 2/19/2023 2304)  Free From Fall Injury: Based on caregiver fall risk screen, instruct family/caregiver to ask for assistance with transferring infant if caregiver noted to have fall risk factors  2/19/2023 1941 by Duane Gutiérrez RN  Outcome: Progressing     Problem: ABCDS Injury Assessment  Goal: Absence of physical injury  2/19/2023 2304 by Lalita Matute RN  Outcome: Progressing  2/19/2023 1941 by Duane Gutiérrez RN  Outcome: Progressing     Problem: Pain  Goal: Verbalizes/displays adequate comfort level or baseline comfort level  Outcome: Progressing

## 2023-02-21 ENCOUNTER — APPOINTMENT (OUTPATIENT)
Facility: HOSPITAL | Age: 73
DRG: 287 | End: 2023-02-21
Payer: MEDICARE

## 2023-02-21 PROBLEM — I10 ACCELERATED HYPERTENSION: Status: ACTIVE | Noted: 2023-02-21

## 2023-02-21 LAB
ANION GAP SERPL CALC-SCNC: 6 MMOL/L (ref 3–18)
BASOPHILS # BLD: 0.1 K/UL (ref 0–0.1)
BASOPHILS NFR BLD: 1 % (ref 0–2)
BUN SERPL-MCNC: 19 MG/DL (ref 7–18)
BUN/CREAT SERPL: 21 (ref 12–20)
CALCIUM SERPL-MCNC: 9.1 MG/DL (ref 8.5–10.1)
CHLORIDE SERPL-SCNC: 104 MMOL/L (ref 100–111)
CO2 SERPL-SCNC: 29 MMOL/L (ref 21–32)
CREAT SERPL-MCNC: 0.9 MG/DL (ref 0.6–1.3)
DIFFERENTIAL METHOD BLD: NORMAL
ECHO BSA: 2.25 M2
EKG DIAGNOSIS: NORMAL
EOSINOPHIL # BLD: 0.3 K/UL (ref 0–0.4)
EOSINOPHIL NFR BLD: 2 % (ref 0–5)
ERYTHROCYTE [DISTWIDTH] IN BLOOD BY AUTOMATED COUNT: 13.5 % (ref 11.6–14.5)
GLUCOSE BLD STRIP.AUTO-MCNC: 150 MG/DL (ref 70–110)
GLUCOSE BLD STRIP.AUTO-MCNC: 176 MG/DL (ref 70–110)
GLUCOSE BLD STRIP.AUTO-MCNC: 182 MG/DL (ref 70–110)
GLUCOSE BLD STRIP.AUTO-MCNC: 188 MG/DL (ref 70–110)
GLUCOSE SERPL-MCNC: 156 MG/DL (ref 74–99)
HCT VFR BLD AUTO: 36.6 % (ref 35–45)
HGB BLD-MCNC: 12.1 G/DL (ref 12–16)
IMM GRANULOCYTES # BLD AUTO: 0 K/UL (ref 0–0.04)
IMM GRANULOCYTES NFR BLD AUTO: 0 % (ref 0–0.5)
LV EF: 61 %
LVEF MODALITY: NORMAL
LYMPHOCYTES # BLD: 3.1 K/UL (ref 0.9–3.6)
LYMPHOCYTES NFR BLD: 29 % (ref 21–52)
MAGNESIUM SERPL-MCNC: 2 MG/DL (ref 1.6–2.6)
MCH RBC QN AUTO: 27.7 PG (ref 24–34)
MCHC RBC AUTO-ENTMCNC: 33.1 G/DL (ref 31–37)
MCV RBC AUTO: 83.8 FL (ref 78–100)
MONOCYTES # BLD: 0.8 K/UL (ref 0.05–1.2)
MONOCYTES NFR BLD: 7 % (ref 3–10)
NEUTS SEG # BLD: 6.6 K/UL (ref 1.8–8)
NEUTS SEG NFR BLD: 61 % (ref 40–73)
NRBC # BLD: 0 K/UL (ref 0–0.01)
NRBC BLD-RTO: 0 PER 100 WBC
NUC STRESS EJECTION FRACTION: 61 %
PLATELET # BLD AUTO: 369 K/UL (ref 135–420)
PMV BLD AUTO: 9.4 FL (ref 9.2–11.8)
POTASSIUM SERPL-SCNC: 3.7 MMOL/L (ref 3.5–5.5)
RBC # BLD AUTO: 4.37 M/UL (ref 4.2–5.3)
SODIUM SERPL-SCNC: 139 MMOL/L (ref 136–145)
STRESS ESTIMATED WORKLOAD: 1 METS
STRESS PEAK DIAS BP: 87 MMHG
STRESS PEAK SYS BP: 177 MMHG
STRESS PERCENT HR ACHIEVED: 51 %
STRESS POST PEAK HR: 74 BPM
STRESS RATE PRESSURE PRODUCT: NORMAL BPM*MMHG
STRESS TARGET HR: 146 BPM
TID: 0.95
TROPONIN I SERPL HS-MCNC: 17 NG/L (ref 0–54)
WBC # BLD AUTO: 10.8 K/UL (ref 4.6–13.2)

## 2023-02-21 PROCEDURE — A9500 TC99M SESTAMIBI: HCPCS | Performed by: INTERNAL MEDICINE

## 2023-02-21 PROCEDURE — 6360000002 HC RX W HCPCS

## 2023-02-21 PROCEDURE — 1100000003 HC PRIVATE W/ TELEMETRY

## 2023-02-21 PROCEDURE — 6360000002 HC RX W HCPCS: Performed by: HOSPITALIST

## 2023-02-21 PROCEDURE — A9500 TC99M SESTAMIBI: HCPCS | Performed by: HOSPITALIST

## 2023-02-21 PROCEDURE — 3430000000 HC RX DIAGNOSTIC RADIOPHARMACEUTICAL: Performed by: HOSPITALIST

## 2023-02-21 PROCEDURE — 80048 BASIC METABOLIC PNL TOTAL CA: CPT

## 2023-02-21 PROCEDURE — 83735 ASSAY OF MAGNESIUM: CPT

## 2023-02-21 PROCEDURE — 93017 CV STRESS TEST TRACING ONLY: CPT

## 2023-02-21 PROCEDURE — 85025 COMPLETE CBC W/AUTO DIFF WBC: CPT

## 2023-02-21 PROCEDURE — 36415 COLL VENOUS BLD VENIPUNCTURE: CPT

## 2023-02-21 PROCEDURE — 82962 GLUCOSE BLOOD TEST: CPT

## 2023-02-21 PROCEDURE — 3430000000 HC RX DIAGNOSTIC RADIOPHARMACEUTICAL: Performed by: INTERNAL MEDICINE

## 2023-02-21 PROCEDURE — 2580000003 HC RX 258: Performed by: HOSPITALIST

## 2023-02-21 PROCEDURE — 6370000000 HC RX 637 (ALT 250 FOR IP): Performed by: HOSPITALIST

## 2023-02-21 RX ORDER — TECHNETIUM TC-99M SESTAMIBI 1 MG/10ML
31.6 INJECTION INTRAVENOUS
Status: COMPLETED | OUTPATIENT
Start: 2023-02-21 | End: 2023-02-21

## 2023-02-21 RX ORDER — TECHNETIUM TC-99M SESTAMIBI 1 MG/10ML
11.1 INJECTION INTRAVENOUS
Status: COMPLETED | OUTPATIENT
Start: 2023-02-21 | End: 2023-02-21

## 2023-02-21 RX ADMIN — SODIUM CHLORIDE, PRESERVATIVE FREE 10 ML: 5 INJECTION INTRAVENOUS at 20:38

## 2023-02-21 RX ADMIN — TETRAKIS(2-METHOXYISOBUTYLISOCYANIDE)COPPER(I) TETRAFLUOROBORATE 11.1 MILLICURIE: 1 INJECTION, POWDER, LYOPHILIZED, FOR SOLUTION INTRAVENOUS at 08:40

## 2023-02-21 RX ADMIN — REGADENOSON 0.4 MG: 0.08 INJECTION, SOLUTION INTRAVENOUS at 12:06

## 2023-02-21 RX ADMIN — HYDROCHLOROTHIAZIDE 25 MG: 25 TABLET ORAL at 08:20

## 2023-02-21 RX ADMIN — LISINOPRIL 20 MG: 20 TABLET ORAL at 08:20

## 2023-02-21 RX ADMIN — AMLODIPINE BESYLATE 5 MG: 5 TABLET ORAL at 08:20

## 2023-02-21 RX ADMIN — POTASSIUM CHLORIDE 20 MEQ: 1500 TABLET, EXTENDED RELEASE ORAL at 08:20

## 2023-02-21 RX ADMIN — ATORVASTATIN CALCIUM 20 MG: 20 TABLET, FILM COATED ORAL at 20:37

## 2023-02-21 RX ADMIN — TETRAKIS(2-METHOXYISOBUTYLISOCYANIDE)COPPER(I) TETRAFLUOROBORATE 31.6 MILLICURIE: 1 INJECTION, POWDER, LYOPHILIZED, FOR SOLUTION INTRAVENOUS at 12:06

## 2023-02-21 RX ADMIN — ASPIRIN 81 MG: 81 TABLET, COATED ORAL at 08:20

## 2023-02-21 RX ADMIN — SODIUM CHLORIDE, PRESERVATIVE FREE 10 ML: 5 INJECTION INTRAVENOUS at 08:34

## 2023-02-21 RX ADMIN — VENLAFAXINE HYDROCHLORIDE 75 MG: 75 CAPSULE, EXTENDED RELEASE ORAL at 08:21

## 2023-02-21 RX ADMIN — ZOLPIDEM TARTRATE 5 MG: 5 TABLET ORAL at 23:56

## 2023-02-21 RX ADMIN — HYDROCODONE BITARTRATE AND ACETAMINOPHEN 1 TABLET: 10; 325 TABLET ORAL at 17:44

## 2023-02-21 RX ADMIN — VENLAFAXINE HYDROCHLORIDE 150 MG: 150 CAPSULE, EXTENDED RELEASE ORAL at 08:20

## 2023-02-21 RX ADMIN — ENOXAPARIN SODIUM 40 MG: 100 INJECTION SUBCUTANEOUS at 08:18

## 2023-02-21 RX ADMIN — PANTOPRAZOLE SODIUM 40 MG: 40 TABLET, DELAYED RELEASE ORAL at 06:10

## 2023-02-21 ASSESSMENT — PAIN DESCRIPTION - ORIENTATION: ORIENTATION: LOWER;MID

## 2023-02-21 ASSESSMENT — PAIN SCALES - GENERAL
PAINLEVEL_OUTOF10: 0
PAINLEVEL_OUTOF10: 8
PAINLEVEL_OUTOF10: 0

## 2023-02-21 ASSESSMENT — PAIN DESCRIPTION - LOCATION: LOCATION: BACK

## 2023-02-21 ASSESSMENT — PAIN DESCRIPTION - DESCRIPTORS: DESCRIPTORS: SHARP

## 2023-02-21 NOTE — PROGRESS NOTES
Hospitalist Progress Note    Patient: Joe Junior MRN: [id number]  CSN: [id number]    YOB: 1950  Age: 74 y.o. Sex: female    DOA: 2/19/2023 LOS:  LOS: 2 days            Chief complaint: chest pain, dm ,htn hld     Assessment/Plan     Active Hospital Problems    Diagnosis Date Noted    Accelerated hypertension [I10] 02/21/2023     Priority: Medium    Chest pain [R07.9] 02/19/2023     Priority: Medium    Type 2 diabetes mellitus ([address] [E11.9] 02/19/2023     Priority: Medium    Hyperlipidemia [E78.5] 02/19/2023     Priority: Medium    Acute chest pain [R07.9] 02/19/2023     Priority: Medium    Aortic ectasia ([address] [I77.819] 02/19/2023     Priority: Medium    Depression [F32. A] 02/19/2023     Priority: Medium    Chronic pain [G89.29] 02/19/2023     Priority: Medium        Pt is admitted for ischemic cardiac work up . Chest pain-no chest pain   Cardiac monitor, ce trend need to r/o acs   Ekg: no st change at this point,  morphine /nitro prn for chest pain  Cardiology seeing pt   Echo done with normal ef and stress test  tomorrow      DM type II , used to take insulin , now on semaglutide (OZEMPIC   ssi, diabetic diet , hypoglycemia protocol)       HTN, accelerated  Continue home medication. Depression and anxiety   On effexor highest dose at home   Xanax at home as needed   Also on ambien at night      Hld check lipid profile continue lipitor      Aortic ectasia  Need to follow-up with cardiologist     Chronic pain continue home pain medication  Narcan as needed    K replacement     Wbc elevated no fever -continue monitoring, will check ua ,cxr good      Subjective : Off the floor getting stress test     Feeling ok after back from ST. Wants to go home but understands further testing needed      Disposition :tbd   Review of systems:    General: No fevers or chills. Cardiovascular: No chest pain or pressure. No palpitations. Pulmonary: No shortness of breath.    Gastrointestinal: No nausea, vomiting. Vital signs/Intake and Output:  Visit Vitals  BP (!) 144/69   Pulse 62   Temp 98.3 °F (36.8 °C) (Oral)   Resp 18   Ht 5' 6\" (1.676 m)   Wt 240 lb 11.2 oz (109.2 kg)   SpO2 98%   BMI 38.85 kg/m²     Current Shift:  02/21 0701 - 02/21 1900  In: 118 [P.O.:118]  Out: -   Last three shifts:  02/19 1901 - 02/21 0700  In: 60 [P.O.:60]  Out: -     Physical Exam:  General: WD, WN. Alert, cooperative, no acute distress    HEENT: NC, Atraumatic. PERRLA, anicteric sclerae. Lungs: CTA Bilaterally. No Wheezing/Rhonchi/Rales. Heart:  Regular  rhythm,  No murmur, No Rubs, No Gallops  Abdomen: Soft, Non distended, Non tender. +Bowel sounds,   Extremities: No c/c/e  Psych:   Not anxious or agitated. Neurologic:  No acute neurological deficit. Labs: Results:       Chemistry Recent Labs     02/19/23  1053 02/20/23  0158 02/21/23  0329    136 139   K 3.5 3.1* 3.7    102 104   CO2 29 28 29   BUN 12 17 19*   GLOB 4.3*  --   --       CBC w/Diff Recent Labs     02/19/23  1053 02/20/23  0158 02/21/23  0329   WBC 11.9 14.0* 10.8   RBC 4.92 4.50 4.37   HGB 13.5 12.4 12.1   HCT 41.0 37.3 36.6    390 369      Cardiac Enzymes No results for input(s): CPK, KIRSTY in the last 72 hours. Invalid input(s): CKRMB, CKND1, TROIP   Coagulation No results for input(s): INR, APTT in the last 72 hours. Invalid input(s): PTP    Lipid Panel Lab Results   Component Value Date/Time    CHOL 185 02/19/2023 06:52 PM    HDL 57 02/19/2023 06:52 PM      BNP Invalid input(s): BNPP   Liver Enzymes No results for input(s): TP, ALB in the last 72 hours.     Invalid input(s): TBIL, AP, SGOT, GPT, DBIL   Thyroid Studies No results found for: T4, TSH     Procedures/imaging: see electronic medical records for all procedures/Xrays and details which were not copied into this note but were reviewed prior to creation of Plan    CTA CHEST W WO CONTRAST    Result Date: 2/19/2023  EXAM: CTA CHEST W WO CONTRAST INDICATION: Chest Pain and positive D Dimer, PE protocol COMPARISON: None. CONTRAST: 100 mL of Isovue-370. TECHNIQUE: Precontrast  images were obtained to localize the volume for acquisition. Multislice helical CT arteriography was performed from the diaphragm to the thoracic inlet during uneventful rapid bolus intravenous contrast administration. Lung and soft tissue windows were generated. Coronal and sagittal images were generated and 3D post processing consisting of coronal maximum intensity images was performed. CT dose reduction was achieved through use of a standardized protocol tailored for this examination and automatic exposure control for dose modulation. FINDINGS: The lungs are clear of mass, nodule, airspace disease or edema. The pulmonary arteries are well enhanced and no pulmonary emboli are identified. Main pulmonary artery measures 3.4 cm in axial diameter. Mild cardiomegaly. There is no mediastinal or hilar adenopathy or mass. Right paratracheal node is borderline at 9 mm. The aorta enhances normally without evidence of aneurysm or dissection. The ascending aorta measures 3.9 cm in cross-sectional diameter. Mild coronary artery calcification. The visualized portions of the upper abdominal organs are remarkable for a left mid renal macrolobulated 2.5 cm cyst. Cholecystectomy clips. No evidence for pulmonary embolism. Clear lungs Markedly ectatic ascending aorta, measuring 3.9 cm in diameter. Dilated main pulmonary artery, measuring 3.4 cm in axial diameter, suggesting possible pulmonary arterial hypertension. Mild cardiomegaly. Mild coronary atherosclerosis. XR CHEST 1 VIEW    Result Date: 2/19/2023  EXAM:  XR CHEST 1 VIEW INDICATION: Chest pain COMPARISON: None. TECHNIQUE: Portable AP upright chest view at 1121 hours FINDINGS: The cardiomediastinal contours are within normal limits. The lungs and pleural spaces are clear. There is no pneumothorax. The bones and upper abdomen are unremarkable.      No acute process. Transthoracic echocardiogram (TTE) limited with contrast, bubble, strain, and 3D PRN    Result Date: 2/20/2023    Left Ventricle: Normal left ventricular systolic function with a visually estimated EF of 60 - 65%. Left ventricle size is normal. Mildly increased wall thickness. Normal wall motion. Normal diastolic function. Aortic Valve: Trileaflet valve. Mild regurgitation.

## 2023-02-21 NOTE — PROGRESS NOTES
Physician Progress Note      Divine Choudhary  CSN #:                  [id number]  :                       1950  ADMIT DATE:       2023 10:46 AM  DISCH DATE:  RESPONDING  PROVIDER #:        Pita Perez MD          QUERY TEXT:    Pt admitted with chest pain. If possible, please document in progress notes   and discharge summary if you are evaluating and/or treating any of the   following: The medical record reflects the following:  Risk Factors: CP  Clinical Indicators: History CAD, HTN, hyperlipidemia  / H&P/ Hosp/ She was giving aspirin and a nitro per EMS, chest tightness   resolved. Treatment: Stress test, ASA, Nitrostat, morphine    Thank You  Darlyn Chen RN, CDI, CRCR  Options provided:  -- Chest pain due to CAD with unstable angina  -- Chest pain due to aortic ectasia  -- Chest pain due to please specify. , please specify. -- Other - I will add my own diagnosis  -- Disagree - Not applicable / Not valid  -- Disagree - Clinically unable to determine / Unknown  -- Refer to Clinical Documentation Reviewer    PROVIDER RESPONSE TEXT:    This patient has CAD with unstable angina.     Query created by: Andrea Kemp on 2023 2:02 PM      Electronically signed by:  Pita Perez MD 2023 5:24 PM

## 2023-02-21 NOTE — PLAN OF CARE
Problem: Discharge Planning  Goal: Discharge to home or other facility with appropriate resources  Outcome: Progressing  Flowsheets (Taken 2/20/2023 2000)  Discharge to home or other facility with appropriate resources:   Identify barriers to discharge with patient and caregiver   Identify discharge learning needs (meds, wound care, etc)     Problem: Safety - Adult  Goal: Free from fall injury  Outcome: Progressing  Flowsheets (Taken 2/20/2023 2000)  Free From Fall Injury: Instruct family/caregiver on patient safety     Problem: Pain  Goal: Verbalizes/displays adequate comfort level or baseline comfort level  Outcome: Progressing  Flowsheets (Taken 2/20/2023 2000)  Verbalizes/displays adequate comfort level or baseline comfort level:   Encourage patient to monitor pain and request assistance   Assess pain using appropriate pain scale   Administer analgesics based on type and severity of pain and evaluate response   Implement non-pharmacological measures as appropriate and evaluate response     Problem: Skin/Tissue Integrity  Goal: Absence of new skin breakdown  Description: 1. Monitor for areas of redness and/or skin breakdown  2. Assess vascular access sites hourly  3. Every 4-6 hours minimum:  Change oxygen saturation probe site  4. Every 4-6 hours:  If on nasal continuous positive airway pressure, respiratory therapy assess nares and determine need for appliance change or resting period.   Outcome: Progressing

## 2023-02-22 VITALS
DIASTOLIC BLOOD PRESSURE: 82 MMHG | SYSTOLIC BLOOD PRESSURE: 154 MMHG | RESPIRATION RATE: 16 BRPM | BODY MASS INDEX: 38.89 KG/M2 | TEMPERATURE: 98.3 F | HEART RATE: 64 BPM | HEIGHT: 66 IN | OXYGEN SATURATION: 100 % | WEIGHT: 242 LBS

## 2023-02-22 PROBLEM — R94.39 ABNORMAL NUCLEAR STRESS TEST: Status: ACTIVE | Noted: 2023-02-19

## 2023-02-22 PROBLEM — I20.0 ACCELERATING ANGINA (HCC): Status: ACTIVE | Noted: 2023-02-19

## 2023-02-22 LAB
ACT BLD: 462 SECS (ref 79–138)
ECHO BSA: 2.25 M2
GLUCOSE BLD STRIP.AUTO-MCNC: 163 MG/DL (ref 70–110)
GLUCOSE BLD STRIP.AUTO-MCNC: 197 MG/DL (ref 70–110)
MAGNESIUM SERPL-MCNC: 2 MG/DL (ref 1.6–2.6)

## 2023-02-22 PROCEDURE — 2580000003 HC RX 258: Performed by: INTERNAL MEDICINE

## 2023-02-22 PROCEDURE — 83735 ASSAY OF MAGNESIUM: CPT

## 2023-02-22 PROCEDURE — 93458 L HRT ARTERY/VENTRICLE ANGIO: CPT | Performed by: INTERNAL MEDICINE

## 2023-02-22 PROCEDURE — 85347 COAGULATION TIME ACTIVATED: CPT

## 2023-02-22 PROCEDURE — 82962 GLUCOSE BLOOD TEST: CPT

## 2023-02-22 PROCEDURE — 6370000000 HC RX 637 (ALT 250 FOR IP): Performed by: HOSPITALIST

## 2023-02-22 PROCEDURE — 6360000002 HC RX W HCPCS: Performed by: HOSPITALIST

## 2023-02-22 PROCEDURE — 93571 IV DOP VEL&/PRESS C FLO 1ST: CPT | Performed by: INTERNAL MEDICINE

## 2023-02-22 PROCEDURE — 36415 COLL VENOUS BLD VENIPUNCTURE: CPT

## 2023-02-22 PROCEDURE — B2111ZZ FLUOROSCOPY OF MULTIPLE CORONARY ARTERIES USING LOW OSMOLAR CONTRAST: ICD-10-PCS | Performed by: INTERNAL MEDICINE

## 2023-02-22 PROCEDURE — 2500000003 HC RX 250 WO HCPCS: Performed by: INTERNAL MEDICINE

## 2023-02-22 PROCEDURE — 2580000003 HC RX 258: Performed by: HOSPITALIST

## 2023-02-22 PROCEDURE — 99152 MOD SED SAME PHYS/QHP 5/>YRS: CPT | Performed by: INTERNAL MEDICINE

## 2023-02-22 PROCEDURE — 4A023N7 MEASUREMENT OF CARDIAC SAMPLING AND PRESSURE, LEFT HEART, PERCUTANEOUS APPROACH: ICD-10-PCS | Performed by: INTERNAL MEDICINE

## 2023-02-22 PROCEDURE — 4A033BC MEASUREMENT OF ARTERIAL PRESSURE, CORONARY, PERCUTANEOUS APPROACH: ICD-10-PCS | Performed by: INTERNAL MEDICINE

## 2023-02-22 PROCEDURE — 6360000002 HC RX W HCPCS: Performed by: INTERNAL MEDICINE

## 2023-02-22 PROCEDURE — 6360000004 HC RX CONTRAST MEDICATION: Performed by: INTERNAL MEDICINE

## 2023-02-22 PROCEDURE — C1894 INTRO/SHEATH, NON-LASER: HCPCS | Performed by: INTERNAL MEDICINE

## 2023-02-22 PROCEDURE — C1769 GUIDE WIRE: HCPCS | Performed by: INTERNAL MEDICINE

## 2023-02-22 PROCEDURE — 99153 MOD SED SAME PHYS/QHP EA: CPT | Performed by: INTERNAL MEDICINE

## 2023-02-22 PROCEDURE — 2709999900 HC NON-CHARGEABLE SUPPLY: Performed by: INTERNAL MEDICINE

## 2023-02-22 PROCEDURE — C1887 CATHETER, GUIDING: HCPCS | Performed by: INTERNAL MEDICINE

## 2023-02-22 PROCEDURE — 93572 IV DOP VEL&/PRESS C FLO EA: CPT | Performed by: INTERNAL MEDICINE

## 2023-02-22 RX ORDER — VERAPAMIL HYDROCHLORIDE 2.5 MG/ML
INJECTION, SOLUTION INTRAVENOUS PRN
Status: DISCONTINUED | OUTPATIENT
Start: 2023-02-22 | End: 2023-02-22 | Stop reason: HOSPADM

## 2023-02-22 RX ORDER — HEPARIN SODIUM 200 [USP'U]/100ML
INJECTION, SOLUTION INTRAVENOUS CONTINUOUS PRN
Status: COMPLETED | OUTPATIENT
Start: 2023-02-22 | End: 2023-02-22

## 2023-02-22 RX ORDER — SODIUM CHLORIDE 0.9 % (FLUSH) 0.9 %
5-40 SYRINGE (ML) INJECTION PRN
Status: DISCONTINUED | OUTPATIENT
Start: 2023-02-22 | End: 2023-02-22 | Stop reason: HOSPADM

## 2023-02-22 RX ORDER — MIDAZOLAM HYDROCHLORIDE 1 MG/ML
INJECTION INTRAMUSCULAR; INTRAVENOUS PRN
Status: DISCONTINUED | OUTPATIENT
Start: 2023-02-22 | End: 2023-02-22 | Stop reason: HOSPADM

## 2023-02-22 RX ORDER — ACETAMINOPHEN 325 MG/1
650 TABLET ORAL EVERY 4 HOURS PRN
Status: DISCONTINUED | OUTPATIENT
Start: 2023-02-22 | End: 2023-02-22 | Stop reason: HOSPADM

## 2023-02-22 RX ORDER — SODIUM CHLORIDE 9 MG/ML
INJECTION, SOLUTION INTRAVENOUS PRN
Status: DISCONTINUED | OUTPATIENT
Start: 2023-02-22 | End: 2023-02-22 | Stop reason: HOSPADM

## 2023-02-22 RX ORDER — LIDOCAINE HYDROCHLORIDE 10 MG/ML
INJECTION, SOLUTION INFILTRATION; PERINEURAL PRN
Status: DISCONTINUED | OUTPATIENT
Start: 2023-02-22 | End: 2023-02-22 | Stop reason: HOSPADM

## 2023-02-22 RX ORDER — SODIUM CHLORIDE 0.9 % (FLUSH) 0.9 %
5-40 SYRINGE (ML) INJECTION EVERY 12 HOURS SCHEDULED
Status: DISCONTINUED | OUTPATIENT
Start: 2023-02-22 | End: 2023-02-22 | Stop reason: HOSPADM

## 2023-02-22 RX ORDER — HEPARIN SODIUM 1000 [USP'U]/ML
INJECTION, SOLUTION INTRAVENOUS; SUBCUTANEOUS PRN
Status: DISCONTINUED | OUTPATIENT
Start: 2023-02-22 | End: 2023-02-22 | Stop reason: HOSPADM

## 2023-02-22 RX ORDER — ATORVASTATIN CALCIUM 20 MG/1
20 TABLET, FILM COATED ORAL NIGHTLY
Qty: 30 TABLET | Refills: 3 | Status: SHIPPED | OUTPATIENT
Start: 2023-02-22

## 2023-02-22 RX ORDER — BIVALIRUDIN 250 MG/5ML
INJECTION, POWDER, LYOPHILIZED, FOR SOLUTION INTRAVENOUS PRN
Status: DISCONTINUED | OUTPATIENT
Start: 2023-02-22 | End: 2023-02-22 | Stop reason: HOSPADM

## 2023-02-22 RX ADMIN — VENLAFAXINE HYDROCHLORIDE 150 MG: 150 CAPSULE, EXTENDED RELEASE ORAL at 08:50

## 2023-02-22 RX ADMIN — ASPIRIN 81 MG: 81 TABLET, COATED ORAL at 08:44

## 2023-02-22 RX ADMIN — HYDROCHLOROTHIAZIDE 25 MG: 25 TABLET ORAL at 08:44

## 2023-02-22 RX ADMIN — AMLODIPINE BESYLATE 5 MG: 5 TABLET ORAL at 08:45

## 2023-02-22 RX ADMIN — POTASSIUM CHLORIDE 20 MEQ: 1500 TABLET, EXTENDED RELEASE ORAL at 08:44

## 2023-02-22 RX ADMIN — SODIUM CHLORIDE, PRESERVATIVE FREE 10 ML: 5 INJECTION INTRAVENOUS at 08:46

## 2023-02-22 RX ADMIN — PANTOPRAZOLE SODIUM 40 MG: 40 TABLET, DELAYED RELEASE ORAL at 06:28

## 2023-02-22 RX ADMIN — LISINOPRIL 20 MG: 20 TABLET ORAL at 08:44

## 2023-02-22 RX ADMIN — ENOXAPARIN SODIUM 40 MG: 100 INJECTION SUBCUTANEOUS at 08:43

## 2023-02-22 RX ADMIN — VENLAFAXINE HYDROCHLORIDE 75 MG: 75 CAPSULE, EXTENDED RELEASE ORAL at 08:50

## 2023-02-22 RX ADMIN — HYDROCODONE BITARTRATE AND ACETAMINOPHEN 1 TABLET: 10; 325 TABLET ORAL at 17:07

## 2023-02-22 ASSESSMENT — PAIN SCALES - GENERAL
PAINLEVEL_OUTOF10: 8
PAINLEVEL_OUTOF10: 0

## 2023-02-22 ASSESSMENT — PAIN - FUNCTIONAL ASSESSMENT
PAIN_FUNCTIONAL_ASSESSMENT: NONE - DENIES PAIN

## 2023-02-22 ASSESSMENT — PAIN DESCRIPTION - ORIENTATION: ORIENTATION: LOWER

## 2023-02-22 ASSESSMENT — PAIN DESCRIPTION - DESCRIPTORS: DESCRIPTORS: SHARP

## 2023-02-22 ASSESSMENT — PAIN DESCRIPTION - LOCATION: LOCATION: BACK

## 2023-02-22 NOTE — DISCHARGE SUMMARY
Discharge Summary    Patient: Susi Boston MRN: [id number]  CSN: [id number]    YOB: 1950  Age: 74 y.o. Sex: female    DOA: 2/19/2023 LOS:  LOS: 3 days   Discharge Date:      Primary Care Provider:  Mumtaz Guadarrama MD    Admission Diagnoses: Other chest pain [R07.89]  Aortic ectasia ([facility] [I77.819]  Acute chest pain [R07.9]  Atherosclerosis of native coronary artery of native heart with angina pectoris [facility]) [I25.119]    Discharge Diagnoses: Active Hospital Problems    Diagnosis Date Noted    Accelerated hypertension [I10] 02/21/2023     Priority: Medium    Chest pain [R07.9] 02/19/2023     Priority: Medium    Type 2 diabetes mellitus ([facility] [E11.9] 02/19/2023     Priority: Medium    Hyperlipidemia [E78.5] 02/19/2023     Priority: Medium    Acute chest pain [R07.9] 02/19/2023     Priority: Medium    Aortic ectasia ([facility] [I77.819] 02/19/2023     Priority: Medium    Depression [F32. A] 02/19/2023     Priority: Medium    Chronic pain [G89.29] 02/19/2023     Priority: Medium    Accelerating angina ([facility] [I20.0] 02/19/2023    Abnormal nuclear stress test [R94.39] 02/19/2023       Discharge Condition: stable     Discharge Medications:        Medication List        START taking these medications      atorvastatin 20 MG tablet  Commonly known as: LIPITOR  Take 1 tablet by mouth nightly            CONTINUE taking these medications      ALPRAZolam 0.5 MG tablet  Commonly known as: XANAX     amLODIPine-benazepril 5-20 MG per capsule  Commonly known as: LOTREL     aspirin 81 MG chewable tablet     hydroCHLOROthiazide 25 MG tablet  Commonly known as: HYDRODIURIL     HYDROcodone-acetaminophen  MG per tablet  Commonly known as: NORCO     ibuprofen 800 MG tablet  Commonly known as: ADVIL;MOTRIN     naloxone 4 MG/0.1ML Liqd nasal spray     Ozempic (0.25 or 0.5 MG/DOSE) 2 MG/1.5ML Sopn  Generic drug: Semaglutide(0.25 or 0.5MG/DOS)     potassium chloride 20 MEQ extended release tablet  Commonly known Celestine Johnson     * venlafaxine 150 MG extended release capsule  Commonly known as: EFFEXOR XR     * venlafaxine 75 MG extended release capsule  Commonly known as: EFFEXOR XR     zolpidem 10 MG tablet  Commonly known as: AMBIEN           * This list has 2 medication(s) that are the same as other medications prescribed for you. Read the directions carefully, and ask your doctor or other care provider to review them with you. Where to Get Your Medications        These medications were sent to [address] [place] [address], [address] [place] [address] Ave  [address] [place] [address], [address] [place] [address]      Hours: 24-hours Phone: [phone]   atorvastatin 20 MG tablet         Procedures : left heart cath     Consults: cardiologist -Dr. Mandi Nieves  BP (!) 154/82   Pulse 64   Temp 98.3 °F (36.8 °C) (Oral)   Resp 16   Ht 5' 6\" (1.676 m)   Wt 242 lb (109.8 kg)   SpO2 100%   BMI 39.06 kg/m²     General: Awake, cooperative, no acute distress    HEENT: NC, Atraumatic. PERRLA, EOMI. Anicteric sclerae. Lungs:  CTA Bilaterally. No Wheezing/Rhonchi/Rales. Heart:  Regular  rhythm,  No murmur, No Rubs, No Gallops  Abdomen: Soft, Non distended, Non tender. +Bowel sounds,   Extremities: No c/c/e  Psych:   Not anxious or agitated. Neurologic:  No acute neurological deficits. Admission HPI :   Russell Sinclair is a 74 y.o. female with history of diabetes, hypertension, depression, hyperlipidemia, insomnia presented to ER due to chest tightness last night. Associated with nausea and diaphoresis. The tightness is in the middle of the chest.  Not associate with shortness of breath no palpitation. She was giving aspirin and a nitro per EMS, chest tightness resolved. In ER, EKG no ST segment changes and  first troponin was negative. She has elevated D-dimer,  CTA chest no PE.   Cardiology has been consulted and recommend to admit to  hospital for further work-up. Patient refused to stay, after discussed with pt and family, she agrees to stay in the hospital  for ischemic cardiac work-up. Hospital Course :   Pt was admitted for chest pain. Stress test was positive for ischemic change. Left heart cath done per cardiologist. Discussed with Dr. Vicky Ling for cath results and recommend to have medical treatment. Pt remained no chest pain since admission. She is cleared eo be d c per cardiologist. Kalin Manzano is replaced and bp is controlled per home medication. DM is controlled per insulin. Discharge planning discussed with patient, pt agrees  with the plan and no questions and concerns at this point. Activity: activity as tolerated and no lifting, Driving, or Strenuous exercise for one week     Diet:cardiac and diabetic diet     Follow-up: PCP and cardiologist     Disposition: home     Minutes spent on discharge: 45 min       Labs: Results:       Chemistry Recent Labs     02/20/23  0158 02/21/23  0329    139   K 3.1* 3.7    104   CO2 28 29   BUN 17 19*      CBC w/Diff Recent Labs     02/20/23  0158 02/21/23  0329   WBC 14.0* 10.8   RBC 4.50 4.37   HGB 12.4 12.1   HCT 37.3 36.6    369      Cardiac Enzymes No results for input(s): CPK, KIRSTY in the last 72 hours. Invalid input(s): CKRMB, CKND1, TROIP   Coagulation No results for input(s): INR, APTT in the last 72 hours. Invalid input(s): PTP    Lipid Panel Lab Results   Component Value Date/Time    CHOL 185 02/19/2023 06:52 PM    HDL 57 02/19/2023 06:52 PM      BNP Invalid input(s): BNPP   Liver Enzymes No results for input(s): TP, ALB in the last 72 hours. Invalid input(s): TBIL, AP, SGOT, GPT, DBIL   Thyroid Studies No results found for: T4, TSH         Significant Diagnostic Studies: CTA CHEST W WO CONTRAST    Result Date: 2/19/2023  EXAM: CTA CHEST W WO CONTRAST INDICATION: Chest Pain and positive D Dimer, PE protocol COMPARISON: None.  CONTRAST: 100 mL of Isovue-370. TECHNIQUE: Precontrast  images were obtained to localize the volume for acquisition. Multislice helical CT arteriography was performed from the diaphragm to the thoracic inlet during uneventful rapid bolus intravenous contrast administration. Lung and soft tissue windows were generated.  Coronal and sagittal images were generated and 3D post processing consisting of coronal maximum intensity images was performed.  CT dose reduction was achieved through use of a standardized protocol tailored for this examination and automatic exposure control for dose modulation. FINDINGS: The lungs are clear of mass, nodule, airspace disease or edema. The pulmonary arteries are well enhanced and no pulmonary emboli are identified. Main pulmonary artery measures 3.4 cm in axial diameter. Mild cardiomegaly. There is no mediastinal or hilar adenopathy or mass. Right paratracheal node is borderline at 9 mm. The aorta enhances normally without evidence of aneurysm or dissection. The ascending aorta measures 3.9 cm in cross-sectional diameter. Mild coronary artery calcification. The visualized portions of the upper abdominal organs are remarkable for a left mid renal macrolobulated 2.5 cm cyst. Cholecystectomy clips.     No evidence for pulmonary embolism. Clear lungs Markedly ectatic ascending aorta, measuring 3.9 cm in diameter. Dilated main pulmonary artery, measuring 3.4 cm in axial diameter, suggesting possible pulmonary arterial hypertension. Mild cardiomegaly. Mild coronary atherosclerosis.    XR CHEST 1 VIEW    Result Date: 2/19/2023  EXAM:  XR CHEST 1 VIEW INDICATION: Chest pain COMPARISON: None. TECHNIQUE: Portable AP upright chest view at 1121 hours FINDINGS: The cardiomediastinal contours are within normal limits. The lungs and pleural spaces are clear. There is no pneumothorax. The bones and upper abdomen are unremarkable.     No acute process.     Transthoracic echocardiogram (TTE) limited with contrast,  bubble, strain, and 3D PRN    Result Date: 2/20/2023    Left Ventricle: Normal left ventricular systolic function with a visually estimated EF of 60 - 65%. Left ventricle size is normal. Mildly increased wall thickness. Normal wall motion. Normal diastolic function. Aortic Valve: Trileaflet valve. Mild regurgitation. Nuclear stress test with myocardial perfusion    Result Date: 2/21/2023    Stress Function: Left ventricular function post-stress is normal. Post-stress ejection fraction is 61%. Perfusion Comments: LV perfusion is equivocal. There is no evidence of inducible ischemia. Perfusion Conclusion: There is no evidence of transient ischemic dilation (TID). ECG: Resting ECG demonstrates normal sinus rhythm. ECG: The ECG was negative for ischemia. Stress Test: A pharmacological stress test was performed using lexiscan. Hemodynamics are adequate for diagnosis. Blood pressure demonstrated a hypotensive response and heart rate demonstrated a normal response to stress. The patient reported dyspnea and no chest pain during the stress test. Equivocal pharmacological Lexiscan nuclear stress test Resting EKG sinus rhythm with nonspecific ST changes. No ischemic EKG changes during pharmacological stress test. No arrhythmia during the stress test. Nuclear images are compromised with motion and diaphragmatic attenuation artifact. Nuclear images are severely compromised with high uptake of radioisotope and intestine close to the inferior wall which reduces the accuracy of the stress test. I cannot exclude mid to basal inferior ischemia. Ejection fraction is at 61%.              [facility]     CC: Juan Regan MD negative...

## 2023-02-22 NOTE — PROGRESS NOTES
1130  pt returned to care unit post cardiac cath    Pt alert and oriented   without complaints,  tr band intact to right wrist area, site clean. 1200 lunch given, tolerated well. Family to bedside  1300 air removed from tr band without incident,  site remains clean and dry,  area covered with 2x2 and tegaderm. Neuro vasc assessment of right hand and arm WNL  1400 TRANSFER - OUT REPORT:    Verbal report given to Gisselle Garcia RN  on Lashell Abbott  being transferred to [facility] for continuity of care      Report consisted of patient's Situation, Background, Assessment and   Recommendations(SBAR). Information from the following report(s) Nurse Handoff Report was reviewed with the receiving nurse. Loman Assessment: No data recorded  Lines:   Peripheral IV 02/19/23 Left Antecubital (Active)   Site Assessment Clean, dry & intact 02/22/23 0438   Line Status Capped;Flushed;Normal saline locked 02/22/23 0438   Line Care Cap changed;Ports disinfected; Connections checked and tightened 02/22/23 0438   Phlebitis Assessment No symptoms 02/22/23 0438   Infiltration Assessment 0 02/22/23 0438   Alcohol Cap Used Yes 02/22/23 0438   Dressing Status Clean, dry & intact 02/22/23 0438   Dressing Type Transparent 02/22/23 0438   Dressing Intervention Other (Comment) 02/20/23 2000        Opportunity for questions and clarification was provided.       Patient transported with:  Monitor and Registered Nurse

## 2023-02-22 NOTE — PROGRESS NOTES
Cardiology Progress Note        Patient: Austin Wright        Sex: female          DOA: 2/19/2023  YOB: 1950      Age:  74 y.o.        LOS:  LOS: 2 days   Assessment/Plan     Principal Problem:    Chest pain  Active Problems:    Type 2 diabetes mellitus (HCC)    Hyperlipidemia    Acute chest pain    Aortic ectasia (HCC)    Depression    Chronic pain    Accelerated hypertension  Resolved Problems:    * No resolved hospital problems. *      Plan:  Chest pain consistent with accelerated angina  Equivocal stress test  Multiple risk factors for CAD  I have recommended cardiac cath  Discussed in detail with patient and granddaughter on phone both of them agreed to proceed  I will schedule the procedure tomorrow. TRANSTHORACIC ECHOCARDIOGRAM (TTE) LIMITED (CONTRAST/BUBBLE/3D PRN) 02/20/2023  2:16 PM (Final)    Interpretation Summary    Left Ventricle: Normal left ventricular systolic function with a visually estimated EF of 60 - 65%. Left ventricle size is normal. Mildly increased wall thickness. Normal wall motion. Normal diastolic function. Aortic Valve: Trileaflet valve. Mild regurgitation. Signed by: Latricia Lara MD on 2/20/2023  2:16 PM      Chest pain concerning for angina with multiple risk factor  Currently hemodynamically stable  Above result of echocardiogram discussed with patient  Patient is scheduled for stress test tomorrow                      Subjective:    cc:    Chest pain        REVIEW OF SYSTEMS:     General: No fevers or chills. Cardiovascular: No chest pain or pressure. No palpitations. No ankle swelling  Pulmonary: No SOB, orthopnea, PND  Gastrointestinal: No nausea, vomiting or diarrhea      Objective:      Visit Vitals  BP (!) 144/69   Pulse 62   Temp 98.3 °F (36.8 °C) (Oral)   Resp 18   Ht 5' 6\" (1.676 m)   Wt 240 lb 11.2 oz (109.2 kg)   SpO2 98%   BMI 38.85 kg/m²     Body mass index is 38.85 kg/m².     Physical Exam:  General Appearance: Comfortable, not using accessory muscles of respiration. NECK: No JVD, no thyroidomeglay. LUNGS: Clear bilaterally. HEART: S1+S2 audible,    ABD: Non-tender, BS Audible    EXT: No edema, and no cysnosis. VASCULAR EXAM: Pulses are intact. PSYCHIATRIC EXAM: Mood is appropriate.     Medication:  Current Facility-Administered Medications   Medication Dose Route Frequency    nitroGLYCERIN (NITROSTAT) SL tablet 0.4 mg  0.4 mg SubLINGual Q5 Min PRN    sodium chloride flush 0.9 % injection 5-40 mL  5-40 mL IntraVENous 2 times per day    sodium chloride flush 0.9 % injection 5-40 mL  5-40 mL IntraVENous PRN    0.9 % sodium chloride infusion   IntraVENous PRN    enoxaparin (LOVENOX) injection 40 mg  40 mg SubCUTAneous Daily    ondansetron (ZOFRAN-ODT) disintegrating tablet 4 mg  4 mg Oral Q8H PRN    Or    ondansetron (ZOFRAN) injection 4 mg  4 mg IntraVENous Q6H PRN    polyethylene glycol (GLYCOLAX) packet 17 g  17 g Oral Daily PRN    acetaminophen (TYLENOL) tablet 650 mg  650 mg Oral Q6H PRN    Or    acetaminophen (TYLENOL) suppository 650 mg  650 mg Rectal Q6H PRN    aspirin EC tablet 81 mg  81 mg Oral Daily    pantoprazole (PROTONIX) tablet 40 mg  40 mg Oral QAM AC    morphine (PF) injection 1 mg  1 mg IntraVENous Q4H PRN    atorvastatin (LIPITOR) tablet 20 mg  20 mg Oral Nightly    HYDROcodone-acetaminophen (NORCO)  MG per tablet 1 tablet  1 tablet Oral Q6H PRN    venlafaxine (EFFEXOR XR) extended release capsule 75 mg  75 mg Oral Daily with breakfast    venlafaxine (EFFEXOR XR) extended release capsule 150 mg  150 mg Oral Daily with breakfast    glucose chewable tablet 16 g  4 tablet Oral PRN    dextrose bolus 10% 125 mL  125 mL IntraVENous PRN    Or    dextrose bolus 10% 250 mL  250 mL IntraVENous PRN    glucagon (rDNA) injection 1 mg  1 mg SubCUTAneous PRN    dextrose 10 % infusion   IntraVENous Continuous PRN    insulin lispro (HUMALOG) injection vial 0-16 Units  0-16 Units SubCUTAneous TID WC    insulin lispro (HUMALOG) injection vial 0-4 Units  0-4 Units SubCUTAneous Nightly    amLODIPine (NORVASC) tablet 5 mg  5 mg Oral Daily    lisinopril (PRINIVIL;ZESTRIL) tablet 20 mg  20 mg Oral Daily    hydroCHLOROthiazide (HYDRODIURIL) tablet 25 mg  25 mg Oral Daily    potassium chloride (KLOR-CON M) extended release tablet 20 mEq  20 mEq Oral Daily with breakfast    ALPRAZolam (XANAX) tablet 0.5 mg  0.5 mg Oral Q8H PRN    zolpidem (AMBIEN) tablet 5 mg  5 mg Oral Nightly PRN    naloxone (NARCAN) injection 0.4 mg  0.4 mg IntraVENous PRN               Lab/Data Reviewed:  Procedures/imaging: see electronic medical records for all procedures/Xrays   and details which were not copied into this note but were reviewed prior to creation of Plan        Recent Labs     02/19/23  1053 02/20/23  0158 02/21/23  0329   WBC 11.9 14.0* 10.8   HGB 13.5 12.4 12.1   HCT 41.0 37.3 36.6    390 369       Recent Labs     02/19/23  1053 02/20/23  0158 02/21/23  0329    136 139   K 3.5 3.1* 3.7    102 104   CO2 29 28 29   BUN 12 17 19*         RADIOLOGY:  CTA CHEST W WO CONTRAST    Result Date: 2/19/2023  No evidence for pulmonary embolism. Clear lungs Markedly ectatic ascending aorta, measuring 3.9 cm in diameter. Dilated main pulmonary artery, measuring 3.4 cm in axial diameter, suggesting possible pulmonary arterial hypertension. Mild cardiomegaly. Mild coronary atherosclerosis. XR CHEST 1 VIEW    Result Date: 2/19/2023  No acute process. Cardiology Procedures:   No results found for this or any previous visit. 02/19/23    TRANSTHORACIC ECHOCARDIOGRAM (TTE) LIMITED (CONTRAST/BUBBLE/3D PRN) 02/20/2023  2:16 PM (Final)    Interpretation Summary    Left Ventricle: Normal left ventricular systolic function with a visually estimated EF of 60 - 65%. Left ventricle size is normal. Mildly increased wall thickness. Normal wall motion. Normal diastolic function. Aortic Valve: Trileaflet valve. Mild regurgitation.     Signed by: Shyam Ferguson Denisha Holland MD on 2/20/2023  2:16 PM    No results found for this or any previous visit.                 Signed By: Donna Sainz MD     February 21, 2023

## 2023-02-22 NOTE — INTERVAL H&P NOTE
Update History & Physical    The patient's History and Physical of February 22, 2023 was reviewed with the patient and I examined the patient. There was no change. The surgical site was confirmed by the patient and me.     Plan: The risks, benefits, expected outcome, and alternative to the recommended procedure have been discussed with the patient. Patient understands and wants to proceed with the procedure.     Electronically signed by JIGNESH BRO MD on 2/22/2023 at 10:20 AM

## 2023-02-22 NOTE — PRE SEDATION
Sedation Plan  ASA: class 1 - normal, healthy patient     Mallampati class: I - soft palate, uvula, fauces, pillars visible. Sedation plan: local anesthesia and level 2-1: moderate/analgesia (conscious sedation)    Risks, benefits, and alternatives discussed with patient. Immediate reassessment prior to sedation:  Patient's status reviewed and vital signs assessed; acceptable to perform procedure and proceed to administer sedation as planned.

## 2023-02-22 NOTE — BRIEF OP NOTE
Brief Postoperative Note      Patient: Luigi Santana  YOB: 1950  MRN: [id number]    Date of Procedure: 2/22/2023    Pre-Op Diagnosis: Accelerating angina ([address] [I20.0]  Abnormal nuclear stress test [R94.39]    Post-Op Diagnosis: Same       Procedure(s):  Left heart cath / coronary angiography  Fractional flow reserve (FFR)    Surgeon(s):  Rico Brown MD    Assistant:  * No surgical staff found *    Anesthesia: Other    Estimated Blood Loss (mL): less than 50     Complications: None    Specimens:   * No specimens in log *    Implants:  * No implants in log *      Drains: * No LDAs found *    Findings: Moderate mid LAD 50% and 50% D1 disease with nonischemic IFR. Complete report to follow. Medical treatment is recommended.     Electronically signed by Perfecto Johnson MD on 2/22/2023 at 11:14 AM

## 2023-02-22 NOTE — ADT AUTH CERT
Chest Pain - Care Day 2 (2/20/2023) by Scott Cabral RN       Review Status Review Entered   Completed 2/21/2023 0948       Created By   Scott Carbal RN      Criteria Review      Care Day: 2 Care Date: 2/20/2023 Level of Care: Telemetry    Guideline Day 2    Clinical Status    (X) * Hemodynamic stability    2/21/2023 9:48 AM EST by Cachorro Nathan      VS: T: 98.4, B/P: 146/58, HR 57, RR 18, SPO2 99 RA    (X) * Acute coronary syndrome ruled out    2/21/2023 9:48 AM EST by Cachorro Nathan      not noted    (X) * Pain absent or managed    2/21/2023 9:48 AM EST by Cachorro Nathan      managed    (X) * Dangerous arrhythmia absent    2/21/2023 9:48 AM EST by Cachorro Nathan      not noted    ( ) * No identification of etiology of pain requiring inpatient care    ( ) * Discharge plans and education understood    Activity    ( ) * Ambulatory or acceptable for next level of care    2/21/2023 9:48 AM EST by Cachorro Nathan      bedrest    Routes    (X) * Oral hydration    2/21/2023 9:48 AM EST by Arvilla Buzzard DIET; Regular; 5 carb choices (75 gm/meal); Low Fat/Low Chol/High Fiber/BERNARDO    (X) * Oral medications or regimen acceptable for next level of care    2/21/2023 9:48 AM EST by Cachorro Nathan      prptonix 40mg po daily,potassium chloride 20mEq po daily,potassium chloride 40mEq po,effexor XR 150mg po daily,effexor 75mg po daily,zofran 4mg po q8hrs prn x1    (X) * Oral diet or acceptable for next level of care    2/21/2023 9:48 AM EST by Cachorroziyad Nathan      ADULT DIET; Regular; 5 carb choices (75 gm/meal);  Low Fat/Low Chol/High Fiber/BERNARDO    Medications    (X) Possible aspirin or cardiac medications    2/21/2023 9:48 AM EST by Cachorro Nathan      norvasc 5mg po daily,asa 81mg po daily,lipitor 20mg po nightly,hydrodiuril 25mg po daily,lisinopril 20mg po daily    ( ) Discontinue IV medications    2/21/2023 9:48 AM EST by Cachorro Nathan      morphine 1mg iv q 4hrs prn x2    2/21/2023 9:48 AM EST by Mary Beth Thomas    Subject: Additional Clinical Information    Other meds      lovenox 40mg sc daily       2/21/2023 9:48 AM EST by Mary Beth Thomas    Subject: Additional Clinical Information    abnormal labs: wbc 14.0,K+ 3.1,gluc 156,LDL calculated 117      Urine: sp gracity >1.030,ketones trace,nitrite +, leukocyte est mod,wbc 21-35,rbc 0-3,epi cells 1+,bacteria 3+,nucus few, ca oxalate crystals 3+          Definitions for Care Day 2    Hemodynamic stability    (X) Hemodynamic stability, as indicated by  1 or more  of the following :       (X) Patient hemodynamically stable, as indicated by  ALL  of the following  (1) (2) (3) (4) (5):          (X) Tachycardia absent          (X) Hypotension absent          (X) No evidence of inadequate perfusion (eg, no myocardial ischemia)          (X) No other hemodynamic abnormalities (eg, no Orthostatic hypotension)       Tachycardia absent    (X) Tachycardia absent, as indicated by  1 or more  of the following  (1) (2):       (X) Heart rate less than or equal to 100 beats per minute in adult or child 6 years or older       Hypotension absent    (X) Hypotension absent, as indicated by  1 or more  of the following  (1) (2) (3) (4):       (X) SBP greater than or equal to 90 mm Hg and without recent decrease greater than 40 mm Hg from       baseline in adult or child 10 years or older       Dangerous arrhythmia absent    (X) Dangerous arrhythmia absent, as indicated by absence of  ALL  of the following  (1) (2) (3)    (4) (5) (6) (7) (8) (9) (10):       (X) Resuscitated ventricular fibrillation or cardiac arrest       (X) Ventricular escape rhythm       (X) Sustained (30 seconds or more of ventricular rhythm at greater than 100 beats per minute)       ventricular tachycardia       (X) Nonsustained ventricular tachycardia with myocarditis or ischemia       (X) Type II second-degree atrioventricular block       (X) Third-degree atrioventricular block       (X) New-onset left bundle branch block with suspected myocardial ischemia       (X) Heart rhythms of concern due to association with Hypotension, Respiratory distress, or other       significant symptoms such as Bradycardia with dizziness or syncope, or Tachycardia with       chest pain       * Milestone   Additional Notes   2/20/2023   CARDIOLOGY   Assessment/Plan   Principal Problem:     Chest pain   Active Problems:     Type 2 diabetes mellitus (HCC)     Hyperlipidemia     Acute chest pain     Aortic ectasia (HCC)     Depression     Chronic pain   Resolved Problems:     * No resolved hospital problems. *        Plan:   TRANSTHORACIC ECHOCARDIOGRAM (TTE) LIMITED (CONTRAST/BUBBLE/3D PRN) 02/20/2023  2:16 PM (Final)       Interpretation Summary   ·  Left Ventricle: Normal left ventricular systolic function with a visually estimated EF of 60 - 65%. Left ventricle size is normal. Mildly increased wall thickness. Normal wall motion. Normal diastolic function. ·  Aortic Valve: Trileaflet valve. Mild regurgitation. Signed by: Radha Craig MD on 2/20/2023  2:16 PM        Chest pain concerning for angina with multiple risk factor   Currently hemodynamically stable   Above result of echocardiogram discussed with patient   Patient is scheduled for stress test tomorrow        INTERNAL MED   Chief complaint: chest pain, dm ,htn hld        Physical Exam:   General:         WD, WN. Alert, cooperative, no acute distress     HEENT:           NC, Atraumatic. PERRLA, anicteric sclerae. Lungs:            CTA Bilaterally. No Wheezing/Rhonchi/Rales. Heart:              Regular  rhythm,  No murmur, No Rubs, No Gallops   Abdomen:      Soft, Non distended, Non tender. +Bowel sounds,    Extremities:   No c/c/e   Psych:              Not anxious or agitated. Neurologic:     No acute neurological deficit.          Assessment/Plan   Active Hospital Problems     Diagnosis Date Noted   · Chest pain [R07.9] 02/19/2023     Priority: Medium   · Type 2 diabetes mellitus ([facility] [E11.9] 2023     Priority: Medium   · Hyperlipidemia [E78.5] 2023     Priority: Medium   · Acute chest pain [R07.9] 2023     Priority: Medium   · Aortic ectasia ([facility] [I77.819] 2023     Priority: Medium   · Depression [F32. A] 2023     Priority: Medium   · Chronic pain [G89.29] 2023     Priority: Medium        Pt is admitted for ischemic cardiac work up . Chest pain-no chest pain    Cardiac monitor, ce trend need to r/o acs    Ekg: no st change at this point,   morphine /nitro prn for chest pain   Cardiology seeing pt    Echo done with normal ef and stress test  tomorrow         DM type II , used to take insulin , now on semaglutide (OZEMPIC    ssi, diabetic diet , hypoglycemia protocol          HTN, accelerated   Continue home medication.        Depression and anxiety    On effexor highest dose at home    Xanax at home as needed    Also on ambien at night        Hld check lipid profile continue lipitor        Aortic ectasia   Need to follow-up with cardiologist       Chronic pain continue home pain medication   Narcan as needed       K replacement        Wbc elevated no fever -continue monitoring, will check ua ,cxr good        Subjective I am [facility]        Unable to have stress test done today due to pt load        Disposition :charbel [employer] RECOMMENDATION by Cruz Tena RN       Review Status Review Entered   In Primary 2023 0700       Created By   Cruz Tena RN      Criteria Review   slr keep in    Name: Edin Mantilla   : 1950   CSN: [id number] [employer]  -----------------------------------------------------------------------------  [address] Physician Advisor Recommendation    Based on the clinical acuity, complexity, hospital course, data review and physician/consultant impressions and findings noted below it is our recommendation to [facility] patient [facility].  ---------------------------------------------------------------------------------    Rationale: Stress test pending, persistent chest pain, IV Morphine day 2. May need Cardiac cath! Clinical summary (History & exam, Vitals, Labs and Imaging) : 73 yo with Chest pain possible accelerated angina per cardiology. AMI ruled out  Multiple risk factors for CAD including hypertension, hyperlipidemia, diabetes mellitus and obesity  echocardiogram normal and stress test tomorrow    Status determination based on clinical judgement, MCG / IQ criteria - Yes. Comments - Keep patient as Inpatient status.    -------------------------------------------------------------------------------  Chart reviewed and VUR notified. Preston Hamlin MD MPH  Physician [address]   [email or web address]    The final decision of the patient's hospitalization status depends on the attending physician's judgment.

## (undated) DEVICE — SPLINT WR VELC FOAM NEUT POS DISP FOR RAD ART ACC SFT STRP

## (undated) DEVICE — DRAPE EP LT SUBCLAV ENTRY SHLD SORBX

## (undated) DEVICE — GUIDEWIRE VASC L260CM DIA0.035IN TIP L3MM STD EXCHG PTFE J

## (undated) DEVICE — CATHETER GUID EBU3.5 0.071 INX6 FRX100 CM CORONARY LAUNCHER

## (undated) DEVICE — Device: Brand: OMNIWIRE PRESSURE GUIDE WIRE

## (undated) DEVICE — CATHETER DIAG 5FR L100CM LUMN ID0.047IN JR4 CRV 0 SIDE H

## (undated) DEVICE — DRAPE,ANGIO,BRACH,STERILE,38X44: Brand: MEDLINE

## (undated) DEVICE — RADIFOCUS GLIDEWIRE: Brand: GLIDEWIRE

## (undated) DEVICE — SENSOR PLSE OXMTR AD CBL L36IN ADH FRM FIT SPO2 DISP

## (undated) DEVICE — PACK PROCEDURE SURG CATH CUST

## (undated) DEVICE — GLIDESHEATH SLENDER STAINLESS STEEL KIT: Brand: GLIDESHEATH SLENDER

## (undated) DEVICE — STOPCOCK TRNSDUC 500PSI 3 W ROT M LUER LT BLU OFF HNDL R

## (undated) DEVICE — COPILOT BLEEDBACK CONTROL VALVE: Brand: COPILOT

## (undated) DEVICE — TORCON NB, ADVANTAGE CATHETER: Brand: TORCON NB